# Patient Record
Sex: FEMALE | Race: BLACK OR AFRICAN AMERICAN | NOT HISPANIC OR LATINO | Employment: FULL TIME | URBAN - METROPOLITAN AREA
[De-identification: names, ages, dates, MRNs, and addresses within clinical notes are randomized per-mention and may not be internally consistent; named-entity substitution may affect disease eponyms.]

---

## 2018-02-22 ENCOUNTER — HISTORICAL (OUTPATIENT)
Dept: ADMINISTRATIVE | Facility: HOSPITAL | Age: 23
End: 2018-02-22

## 2018-02-22 LAB — FINAL CULTURE: NORMAL

## 2018-03-06 ENCOUNTER — HISTORICAL (OUTPATIENT)
Dept: LAB | Facility: HOSPITAL | Age: 23
End: 2018-03-06

## 2018-03-06 LAB
ABS NEUT (OLG): 6.47 X10(3)/MCL (ref 2.1–9.2)
AMPHET UR QL SCN: NORMAL
APPEARANCE, UA: CLEAR
BACTERIA SPEC CULT: ABNORMAL /HPF
BARBITURATE SCN PRESENT UR: NORMAL
BASOPHILS # BLD AUTO: 0 X10(3)/MCL (ref 0–0.2)
BASOPHILS NFR BLD AUTO: 0 %
BENZODIAZ UR QL SCN: NORMAL
BILIRUB UR QL STRIP: NEGATIVE
CANNABINOIDS UR QL SCN: NORMAL
COCAINE UR QL SCN: NORMAL
COLOR UR: YELLOW
EOSINOPHIL # BLD AUTO: 0 X10(3)/MCL (ref 0–0.9)
EOSINOPHIL NFR BLD AUTO: 0 %
ERYTHROCYTE [DISTWIDTH] IN BLOOD BY AUTOMATED COUNT: 12.2 % (ref 11.5–17)
GLUCOSE (UA): NEGATIVE
GROUP & RH: NORMAL
HBV SURFACE AG SERPL QL IA: NEGATIVE
HCT VFR BLD AUTO: 37.5 % (ref 37–47)
HGB BLD-MCNC: 11.4 GM/DL (ref 12–16)
HGB UR QL STRIP: NEGATIVE
HIV 1+2 AB+HIV1 P24 AG SERPL QL IA: NEGATIVE
KETONES UR QL STRIP: ABNORMAL
LEUKOCYTE ESTERASE UR QL STRIP: NEGATIVE
LYMPHOCYTES # BLD AUTO: 2.7 X10(3)/MCL (ref 0.6–4.6)
LYMPHOCYTES NFR BLD AUTO: 27 %
MCH RBC QN AUTO: 27.6 PG (ref 27–31)
MCHC RBC AUTO-ENTMCNC: 30.4 GM/DL (ref 33–36)
MCV RBC AUTO: 90.8 FL (ref 80–94)
MONOCYTES # BLD AUTO: 0.7 X10(3)/MCL (ref 0.1–1.3)
MONOCYTES NFR BLD AUTO: 7 %
NEUTROPHILS # BLD AUTO: 6.47 X10(3)/MCL (ref 1.4–7.9)
NEUTROPHILS NFR BLD AUTO: 65 %
NITRITE UR QL STRIP: NEGATIVE
OPIATES UR QL SCN: NORMAL
PCP UR QL: NORMAL
PH UR STRIP.AUTO: 6 [PH] (ref 5–7.5)
PH UR STRIP: 6 [PH] (ref 5–9)
PLATELET # BLD AUTO: 283 X10(3)/MCL (ref 130–400)
PMV BLD AUTO: 10.7 FL (ref 9.4–12.4)
PROT UR QL STRIP: NEGATIVE
RBC # BLD AUTO: 4.13 X10(6)/MCL (ref 4.2–5.4)
RBC #/AREA URNS HPF: ABNORMAL /[HPF]
RPR SER QL: NORMAL
SP GR FLD REFRACTOMETRY: 1.02 (ref 1–1.03)
SP GR UR STRIP: 1.02 (ref 1–1.03)
SQUAMOUS EPITHELIAL, UA: ABNORMAL
UROBILINOGEN UR STRIP-ACNC: 0.2
WBC # SPEC AUTO: 10 X10(3)/MCL (ref 4.5–11.5)
WBC #/AREA URNS HPF: ABNORMAL /[HPF]

## 2018-06-29 ENCOUNTER — HISTORICAL (OUTPATIENT)
Dept: ADMINISTRATIVE | Facility: HOSPITAL | Age: 23
End: 2018-06-29

## 2018-06-29 LAB
ABS NEUT (OLG): 8.33 X10(3)/MCL (ref 2.1–9.2)
BASOPHILS # BLD AUTO: 0 X10(3)/MCL (ref 0–0.2)
BASOPHILS NFR BLD AUTO: 0 %
EOSINOPHIL # BLD AUTO: 0 X10(3)/MCL (ref 0–0.9)
EOSINOPHIL NFR BLD AUTO: 0 %
ERYTHROCYTE [DISTWIDTH] IN BLOOD BY AUTOMATED COUNT: 12.3 % (ref 11.5–17)
GLUCOSE 1H P 100 G GLC PO SERPL-MCNC: 159 MG/DL (ref 100–180)
HCT VFR BLD AUTO: 33.8 % (ref 37–47)
HGB BLD-MCNC: 10.3 GM/DL (ref 12–16)
LYMPHOCYTES # BLD AUTO: 2.4 X10(3)/MCL (ref 0.6–4.6)
LYMPHOCYTES NFR BLD AUTO: 21 %
MCH RBC QN AUTO: 28.8 PG (ref 27–31)
MCHC RBC AUTO-ENTMCNC: 30.5 GM/DL (ref 33–36)
MCV RBC AUTO: 94.4 FL (ref 80–94)
MONOCYTES # BLD AUTO: 0.6 X10(3)/MCL (ref 0.1–1.3)
MONOCYTES NFR BLD AUTO: 6 %
NEUTROPHILS # BLD AUTO: 8.33 X10(3)/MCL (ref 2.1–9.2)
NEUTROPHILS NFR BLD AUTO: 72 %
PLATELET # BLD AUTO: 244 X10(3)/MCL (ref 130–400)
PMV BLD AUTO: 10.4 FL (ref 9.4–12.4)
RBC # BLD AUTO: 3.58 X10(6)/MCL (ref 4.2–5.4)
WBC # SPEC AUTO: 11.6 X10(3)/MCL (ref 4.5–11.5)

## 2018-07-06 ENCOUNTER — HISTORICAL (OUTPATIENT)
Dept: ADMINISTRATIVE | Facility: HOSPITAL | Age: 23
End: 2018-07-06

## 2018-07-06 LAB
GLUCOSE 1H P 100 G GLC PO SERPL-MCNC: 162 MG/DL (ref 100–180)
GLUCOSE 2H P 100 G GLC PO SERPL-MCNC: 147 MG/DL (ref 70–140)
GLUCOSE 3H P 100 G GLC PO SERPL-MCNC: 107 MG/DL (ref 70–115)
GLUCOSE BS SERPL-MCNC: 93 MG/DL (ref 70–115)

## 2018-09-06 ENCOUNTER — HISTORICAL (OUTPATIENT)
Dept: LAB | Facility: HOSPITAL | Age: 23
End: 2018-09-06

## 2018-09-08 LAB — FINAL CULTURE: NORMAL

## 2021-07-26 ENCOUNTER — HISTORICAL (OUTPATIENT)
Dept: ADMINISTRATIVE | Facility: HOSPITAL | Age: 26
End: 2021-07-26

## 2021-07-26 LAB
ABS NEUT (OLG): 5.43 X10(3)/MCL (ref 2.1–9.2)
BASOPHILS # BLD AUTO: 0 X10(3)/MCL (ref 0–0.2)
BASOPHILS NFR BLD AUTO: 0 %
EOSINOPHIL # BLD AUTO: 0.1 X10(3)/MCL (ref 0–0.9)
EOSINOPHIL NFR BLD AUTO: 1 %
ERYTHROCYTE [DISTWIDTH] IN BLOOD BY AUTOMATED COUNT: 12.5 % (ref 11.5–17)
GROUP & RH: NORMAL
HBV SURFACE AG SERPL QL IA: NONREACTIVE
HCT VFR BLD AUTO: 31.5 % (ref 37–47)
HCV AB SERPL QL IA: NONREACTIVE
HGB BLD-MCNC: 10 GM/DL (ref 12–16)
HIV 1+2 AB+HIV1 P24 AG SERPL QL IA: NONREACTIVE
LYMPHOCYTES # BLD AUTO: 1.3 X10(3)/MCL (ref 0.6–4.6)
LYMPHOCYTES NFR BLD AUTO: 17 %
MCH RBC QN AUTO: 28.8 PG (ref 27–31)
MCHC RBC AUTO-ENTMCNC: 31.7 GM/DL (ref 33–36)
MCV RBC AUTO: 90.8 FL (ref 80–94)
MONOCYTES # BLD AUTO: 0.7 X10(3)/MCL (ref 0.1–1.3)
MONOCYTES NFR BLD AUTO: 9 %
NEUTROPHILS # BLD AUTO: 5.43 X10(3)/MCL (ref 2.1–9.2)
NEUTROPHILS NFR BLD AUTO: 72 %
PLATELET # BLD AUTO: 259 X10(3)/MCL (ref 130–400)
PMV BLD AUTO: 11.2 FL (ref 9.4–12.4)
RBC # BLD AUTO: 3.47 X10(6)/MCL (ref 4.2–5.4)
T PALLIDUM AB SER QL: NONREACTIVE
TSH SERPL-ACNC: 0.81 UIU/ML (ref 0.35–4.94)
WBC # SPEC AUTO: 7.5 X10(3)/MCL (ref 4.5–11.5)

## 2021-07-28 LAB — FINAL CULTURE: NORMAL

## 2021-09-13 ENCOUNTER — HISTORICAL (OUTPATIENT)
Dept: ADMINISTRATIVE | Facility: HOSPITAL | Age: 26
End: 2021-09-13

## 2021-09-13 LAB
GLUCOSE 1H P 100 G GLC PO SERPL-MCNC: 126 MG/DL (ref 100–180)
HCT VFR BLD AUTO: 32.9 % (ref 37–47)
HGB BLD-MCNC: 10.1 GM/DL (ref 12–16)

## 2023-02-28 ENCOUNTER — HOSPITAL ENCOUNTER (EMERGENCY)
Facility: HOSPITAL | Age: 28
Discharge: HOME | End: 2023-02-28
Attending: FAMILY MEDICINE
Payer: COMMERCIAL

## 2023-02-28 VITALS
HEART RATE: 67 BPM | WEIGHT: 263 LBS | TEMPERATURE: 98 F | SYSTOLIC BLOOD PRESSURE: 120 MMHG | BODY MASS INDEX: 38.95 KG/M2 | OXYGEN SATURATION: 100 % | HEIGHT: 69 IN | RESPIRATION RATE: 16 BRPM | DIASTOLIC BLOOD PRESSURE: 81 MMHG
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
## 2023-02-28 DIAGNOSIS — S82.831A: Primary | ICD-10-CM

## 2023-02-28 DIAGNOSIS — S82.391A: ICD-10-CM

## 2023-02-28 LAB
B-HCG UR QL: NEGATIVE
CTP QC/QA: YES

## 2023-02-28 PROCEDURE — 81025 URINE PREGNANCY TEST: CPT | Performed by: NURSE PRACTITIONER

## 2023-02-28 PROCEDURE — 29505 APPLICATION LONG LEG SPLINT: CPT | Mod: RT

## 2023-02-28 PROCEDURE — 25000003 PHARM REV CODE 250: Performed by: NURSE PRACTITIONER

## 2023-02-28 PROCEDURE — 99283 EMERGENCY DEPT VISIT LOW MDM: CPT | Mod: 25

## 2023-02-28 RX ORDER — HYDROCODONE BITARTRATE AND ACETAMINOPHEN 5; 325 MG/1; MG/1
1 TABLET ORAL EVERY 6 HOURS PRN
Qty: 18 TABLET | Refills: 0 | Status: SHIPPED | OUTPATIENT
Start: 2023-02-28 | End: 2023-03-09 | Stop reason: ALTCHOICE

## 2023-02-28 RX ORDER — IBUPROFEN 400 MG/1
800 TABLET ORAL
Status: COMPLETED | OUTPATIENT
Start: 2023-02-28 | End: 2023-02-28

## 2023-02-28 RX ADMIN — BACITRACIN, NEOMYCIN, POLYMYXIN B: 400; 3.5; 5 OINTMENT TOPICAL at 09:02

## 2023-02-28 RX ADMIN — IBUPROFEN 800 MG: 400 TABLET ORAL at 08:02

## 2023-02-28 NOTE — ED PROVIDER NOTES
Encounter Date: 2/28/2023       History     Chief Complaint   Patient presents with    Ankle Pain     HOSP. EMPLOYEE REPORTS TWISTING RT ANKLE/KNEE IN HOSP. PARKING LOT PTA.  MINOR ABRASION TO ANKLE AND KNEE.      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 The onset was just pta. The course of symptoms is constant. Type of injury: trip and fall. The location where the incident occurred was in the hospital parking lot.  Location: right ankle and knee. The character of symptoms is pain. The degree of bleeding is none. The degree of pain is minimal. The exacerbating factor is movement and weight bearing. The relieving factor is immobilization. Risk factors consist of none. Therapy today: none.  Associated symptoms: none.       Review of patient's allergies indicates:  No Known Allergies  History reviewed. No pertinent past medical history.  History reviewed. No pertinent surgical history.  History reviewed. No pertinent family history.  Social History     Tobacco Use    Smoking status: Never    Smokeless tobacco: Never     Review of Systems   Constitutional:  Negative for fever.   HENT:  Negative for sore throat.    Respiratory:  Negative for shortness of breath.    Cardiovascular:  Negative for chest pain.   Gastrointestinal:  Negative for nausea.   Genitourinary:  Negative for dysuria.   Musculoskeletal:  Positive for arthralgias. Negative for back pain.   Skin:  Positive for wound. Negative for rash.   Neurological:  Negative for weakness.   Hematological:  Does not bruise/bleed easily.   All other systems reviewed and are negative.    Physical Exam     Initial Vitals [02/28/23 0740]   BP Pulse Resp Temp SpO2   120/81 67 16 97.9 °F (36.6 °C) 100 %      MAP       --         Physical Exam    Nursing note and vitals reviewed.  Constitutional: She appears  well-developed and well-nourished.   HENT:   Head: Normocephalic and atraumatic.   Neck: Neck supple.   Normal range of motion.  Cardiovascular:  Normal rate, regular rhythm, normal heart sounds and intact distal pulses.           Pulmonary/Chest: Breath sounds normal.   Abdominal: Abdomen is soft. Bowel sounds are normal.   Musculoskeletal:         General: Normal range of motion.      Cervical back: Normal range of motion and neck supple.      Comments: Mild ttp right knee without swelling, FROM, mild ttp and swelling right lateral ankle with decreased rom d/t pain, small clean abrasion to medial right ankle, good distal pulses, NVI, right foot warm with good pulses     Neurological: She is alert. She has normal strength.   Skin: Skin is warm and dry.   Psychiatric: She has a normal mood and affect.       ED Course   Splint Application    Date/Time: 2/28/2023 10:30 AM  Performed by: CHATA Bellamy  Authorized by: CHATA Bellamy   Location details: right leg  Splint type: long posterior splint.  Supplies used: Ortho-Glass, elastic bandage and cotton padding  Post-procedure: The splinted body part was neurovascularly unchanged following the procedure.  Patient tolerance: Patient tolerated the procedure well with no immediate complications  Comments: Crutches given      Labs Reviewed   POCT URINE PREGNANCY          Imaging Results              X-Ray Knee 1 or 2 View Right (Final result)  Result time 02/28/23 09:07:12      Final result by Johnny Stoll MD (02/28/23 09:07:12)                   Impression:      Nondisplaced fracture proximal fibula.      Electronically signed by: Johnny Stoll  Date:    02/28/2023  Time:    09:07               Narrative:    EXAMINATION:  XR KNEE 1 OR 2 VIEW RIGHT    CLINICAL HISTORY:  Unspecified fall, initial encounter    COMPARISON:  None    FINDINGS:  Two views of the right knee.  There is nondisplaced fracture proximal shaft of the fibula.  No joint effusion.                                        X-Ray Ankle Complete Right (Final result)  Result time 02/28/23 09:11:54      Final result by Johnny Stoll MD (02/28/23 09:11:54)                   Impression:      Nondisplaced posterior malleolar fracture.      Electronically signed by: Johnny Stoll  Date:    02/28/2023  Time:    09:11               Narrative:    EXAMINATION:  XR ANKLE COMPLETE 3 VIEW RIGHT    CLINICAL HISTORY:  Unspecified fall, initial encounter    COMPARISON:  None    FINDINGS:  Three views of the right ankle.  There is nondisplaced fracture through the posterior malleolus.                                       X-Ray Foot Complete Right (Final result)  Result time 02/28/23 09:12:28      Final result by Johnny Stoll MD (02/28/23 09:12:28)                   Impression:      No acute findings right foot.      Electronically signed by: Johnny Stoll  Date:    02/28/2023  Time:    09:12               Narrative:    EXAMINATION:  XR FOOT COMPLETE 3 VIEW RIGHT    CLINICAL HISTORY:  Unspecified fall, initial encounter    COMPARISON:  None    FINDINGS:  Three views of the right foot.  There is nondisplaced posterior malleolar fracture better demonstrated on ankle radiographs.  No fracture or dislocation of the foot.                                       Medications   Tdap (BOOSTRIX) vaccine injection 0.5 mL (has no administration in time range)   neomycin-bacitracnZn-polymyxnB packet (has no administration in time range)   ibuprofen tablet 800 mg (800 mg Oral Given 2/28/23 0808)     Medical Decision Making:   History:   Old Records Summarized: records from clinic visits and records from previous admission(s).  Clinical Tests:   Radiological Study: Ordered and Reviewed  10:37 AM DISPOSITION: The patient is resting comfortably in no acute distress.  She is hemodynamically stable and is without objective evidence for acute process requiring urgent intervention or hospitalization. I provided counseling to patient with regard to  condition, the treatment plan, specific conditions for return, and the importance of follow up. Detailed written and verbal instructions provided to patient and she expressed a verbal understanding of the discharge instructions and overall management plan. Reiterated the importance of medication administration and safety and advised patient to follow up with primary care provider in 3-5 days or sooner if needed.  Answered questions at this time. The patient is stable for discharge.                           Clinical Impression:   Final diagnoses:  [W19.XXXA] Fall  [S82.831A] Closed traumatic nondisplaced fracture of proximal end of right fibula, initial encounter (Primary)  [S82.391A] Closed traumatic nondisplaced fracture of posterior malleolus of right tibia, initial encounter        ED Disposition Condition    Discharge Stable          ED Prescriptions       Medication Sig Dispense Start Date End Date Auth. Provider    HYDROcodone-acetaminophen (NORCO) 5-325 mg per tablet Take 1 tablet by mouth every 6 (six) hours as needed for Pain. 18 tablet 2/28/2023 -- CHATA Bellamy          Follow-up Information       Follow up With Specialties Details Why Contact Info    urgent referral sent to orthopedic clinic        [facility] Emergency Medicine  If symptoms worsen [address] [facility] [id number] [phone]             CHATA Bellamy  02/28/23 1037

## 2023-02-28 NOTE — Clinical Note
"Elicia Leal"Sadiq was seen and treated in our emergency department on 2/28/2023.  She may return to work on 03/06/2023.  no weight bearing on right leg until cleared by orthopedist     If you have any questions or concerns, please don't hesitate to call.      CHATA Bellamy"

## 2023-03-01 ENCOUNTER — OFFICE VISIT (OUTPATIENT)
Dept: ORTHOPEDICS | Facility: CLINIC | Age: 28
End: 2023-03-01
Payer: COMMERCIAL

## 2023-03-01 ENCOUNTER — HOSPITAL ENCOUNTER (OUTPATIENT)
Dept: RADIOLOGY | Facility: HOSPITAL | Age: 28
Discharge: HOME | End: 2023-03-01
Attending: ORTHOPAEDIC SURGERY
Payer: COMMERCIAL

## 2023-03-01 ENCOUNTER — HOSPITAL ENCOUNTER (OUTPATIENT)
Dept: RADIOLOGY | Facility: HOSPITAL | Age: 28
Discharge: HOME | End: 2023-03-01
Attending: STUDENT IN AN ORGANIZED HEALTH CARE EDUCATION/TRAINING PROGRAM
Payer: COMMERCIAL

## 2023-03-01 DIAGNOSIS — S82.831A: Primary | ICD-10-CM

## 2023-03-01 DIAGNOSIS — S82.831A: ICD-10-CM

## 2023-03-01 DIAGNOSIS — S82.863A MAISONNEUVE FRACTURE: ICD-10-CM

## 2023-03-01 DIAGNOSIS — S82.391A: ICD-10-CM

## 2023-03-01 PROCEDURE — 99499 NO LOS: ICD-10-PCS | Mod: S$PBB,,, | Performed by: SPECIALIST

## 2023-03-01 PROCEDURE — 73610 X-RAY EXAM OF ANKLE: CPT | Mod: TC,RT

## 2023-03-01 PROCEDURE — 73590 X-RAY EXAM OF LOWER LEG: CPT | Mod: TC,RT

## 2023-03-01 PROCEDURE — 1159F PR MEDICATION LIST DOCUMENTED IN MEDICAL RECORD: ICD-10-PCS | Mod: CPTII,,, | Performed by: SPECIALIST

## 2023-03-01 PROCEDURE — 99214 OFFICE O/P EST MOD 30 MIN: CPT | Mod: PBBFAC

## 2023-03-01 PROCEDURE — 99499 UNLISTED E&M SERVICE: CPT | Mod: S$PBB,,, | Performed by: SPECIALIST

## 2023-03-01 PROCEDURE — 1159F MED LIST DOCD IN RCRD: CPT | Mod: CPTII,,, | Performed by: SPECIALIST

## 2023-03-01 RX ORDER — NORGESTIMATE AND ETHINYL ESTRADIOL 0.25-0.035
1 KIT ORAL
COMMUNITY
Start: 2023-01-24 | End: 2023-10-17

## 2023-03-01 RX ORDER — MUPIROCIN 20 MG/G
OINTMENT TOPICAL
Status: CANCELLED | OUTPATIENT
Start: 2023-03-01

## 2023-03-01 NOTE — PROGRESS NOTES
[facility] Orthopaedic Surgery H&P Note    In brief, Elicia Babcock is a 27 y.o. female acute right ankle pain    HPI:  This is a pleasant 27-year-old female here for initial evaluation of acute right ankle pain after she slipped and fell in the parking lot and had a twisting ankle injury.  She says she is been unable to walk on her ankle since then, she is been in a splint for the past day.  She did not injure anything else when she fell.  She works here at the hospital.  She says she is otherwise healthy, no history of smoking or diabetes or cardiac history.  BMI 38.     PMH: History reviewed. No pertinent past medical history.    PSH: History reviewed. No pertinent surgical history.    SH:   Social History     Socioeconomic History    Marital status: Single   Tobacco Use    Smoking status: Never    Smokeless tobacco: Never       FH:   Family History   Family history unknown: Yes       Allergies: Review of patient's allergies indicates:  No Known Allergies    ROS:  Constitutional- no fever, fatigue, weakness  Eye- no vision loss, eye redness, drainage, or pain  ENMT- no sore throat, ear pain, sinus pain/congestion, nasal congestion/drainage  Respiratory- no cough, wheezing, or shortness of breath  CV- no chest pain, no palpitations, no edema  GI- no N/V/D; no abdominal pain    Physical Exam:  There were no vitals filed for this visit.    General: NAD  Cardio: RRR by peripheral pulse  Pulm: Normal WOB on room air, symmetric chest rise  Abd: Soft, NT/ND    MSK:  Right lower extremity  Negative logroll   Full painless flexion extension of the knee  Tenderness palpation of the proximal fibula  Tenderness to palpation over the medial malleolus   Notable ankle swelling   A superficial abrasion over the anteromedial aspect of the ankle   No open wounds our deep lacerations   Pain with ankle dorsiflexion plantar flexion  Some diminished sensation of the dorsum of the foot   Sensation intact to light touch over the plantar aspect  of the foot  Foot is warm and well-perfused   EHL/FHL 5/5       Imaging:   Independent review of radiographs shows a Maisonneuve ankle fracture, medial and posterior malleolus fracture with proximal fibula fracture.  Ankle mortise is well aligned    Assessment:  27-year-old female Maisonneuve ankle fracture sustained on 02/28/2023     -we had a long discussion that the injury pattern warrants fixation of her right ankle.  We will get a CT scan to further evaluate the extent of the osseous injury   -we will put her on the schedule tentatively for right ankle syndesmosis fixation with ankle ORIF on 03/14/2023   -we will put her in a splint for now, maintain strict nonweightbearing, went over the importance of elevation to help with preoperative swelling  -we will see her back next Wednesday to discuss the results and book her for surgery        Dr. Holly Puga  [facility] Orthopaedic Surgery           None

## 2023-03-01 NOTE — PROGRESS NOTES
Faculty Attestation: Elicia Babcock  was seen at [facility] in the Orthopaedic Clinic. Discussed with the resident at the time of the visit. History of Present Illness, Physical Exam, and Assessment and Plan reviewed. Treatment plan is reasonable and appropriate. Compliance with treatment recommendations is important. Discussed with the resident at the time of the visit.  No procedure was performed.     Guevara Mae MD MD  Orthopaedic Surgery

## 2023-03-06 ENCOUNTER — HOSPITAL ENCOUNTER (OUTPATIENT)
Dept: RADIOLOGY | Facility: HOSPITAL | Age: 28
Discharge: HOME | End: 2023-03-06
Attending: ORTHOPAEDIC SURGERY
Payer: COMMERCIAL

## 2023-03-06 DIAGNOSIS — S82.831A: ICD-10-CM

## 2023-03-06 PROCEDURE — 73700 CT LOWER EXTREMITY W/O DYE: CPT | Mod: TC,RT

## 2023-03-07 ENCOUNTER — ANESTHESIA EVENT (OUTPATIENT)
Dept: SURGERY | Facility: HOSPITAL | Age: 28
End: 2023-03-07
Payer: COMMERCIAL

## 2023-03-08 ENCOUNTER — HOSPITAL ENCOUNTER (OUTPATIENT)
Dept: RADIOLOGY | Facility: HOSPITAL | Age: 28
Discharge: HOME | End: 2023-03-08
Attending: STUDENT IN AN ORGANIZED HEALTH CARE EDUCATION/TRAINING PROGRAM
Payer: COMMERCIAL

## 2023-03-08 ENCOUNTER — OFFICE VISIT (OUTPATIENT)
Dept: ORTHOPEDICS | Facility: CLINIC | Age: 28
End: 2023-03-08
Payer: OTHER MISCELLANEOUS

## 2023-03-08 VITALS — HEIGHT: 69 IN | BODY MASS INDEX: 38.95 KG/M2 | WEIGHT: 263 LBS

## 2023-03-08 DIAGNOSIS — M25.571 CHRONIC PAIN OF RIGHT ANKLE: Primary | ICD-10-CM

## 2023-03-08 DIAGNOSIS — G89.29 CHRONIC PAIN OF RIGHT ANKLE: ICD-10-CM

## 2023-03-08 DIAGNOSIS — M25.571 CHRONIC PAIN OF RIGHT ANKLE: ICD-10-CM

## 2023-03-08 DIAGNOSIS — G89.29 CHRONIC PAIN OF RIGHT ANKLE: Primary | ICD-10-CM

## 2023-03-08 PROCEDURE — 99204 OFFICE O/P NEW MOD 45 MIN: CPT | Mod: S$PBB,,, | Performed by: STUDENT IN AN ORGANIZED HEALTH CARE EDUCATION/TRAINING PROGRAM

## 2023-03-08 PROCEDURE — 99213 OFFICE O/P EST LOW 20 MIN: CPT | Mod: PBBFAC

## 2023-03-08 PROCEDURE — 99204 PR OFFICE/OUTPT VISIT, NEW, LEVL IV, 45-59 MIN: ICD-10-PCS | Mod: S$PBB,,, | Performed by: STUDENT IN AN ORGANIZED HEALTH CARE EDUCATION/TRAINING PROGRAM

## 2023-03-08 NOTE — PROGRESS NOTES
[facility] Orthopaedic Surgery H&P Note    In brief, Elicia Babcock is a 27 y.o. female acute right ankle pain    HPI:  This is a pleasant 27-year-old female here for initial evaluation of acute right ankle pain after she slipped and fell in the parking lot and had a twisting ankle injury.  She says she is been unable to walk on her ankle since then, she is been in a splint for the past day.  She did not injure anything else when she fell.  She works here at the hospital.  She says she is otherwise healthy, no history of smoking or diabetes or cardiac history.  BMI 38.     She is here to discuss her CT scan results    PMH: No past medical history on file.    PSH: No past surgical history on file.    SH:   Social History     Socioeconomic History    Marital status: Single   Tobacco Use    Smoking status: Never    Smokeless tobacco: Never       FH:   Family History   Family history unknown: Yes       Allergies: Review of patient's allergies indicates:  No Known Allergies    ROS:  Constitutional- no fever, fatigue, weakness  Eye- no vision loss, eye redness, drainage, or pain  ENMT- no sore throat, ear pain, sinus pain/congestion, nasal congestion/drainage  Respiratory- no cough, wheezing, or shortness of breath  CV- no chest pain, no palpitations, no edema  GI- no N/V/D; no abdominal pain    Physical Exam:  There were no vitals filed for this visit.    General: NAD  Cardio: RRR by peripheral pulse  Pulm: Normal WOB on room air, symmetric chest rise  Abd: Soft, NT/ND    MSK:  Right lower extremity  Negative logroll   Full painless flexion extension of the knee  Tenderness palpation of the proximal fibula  Tenderness to palpation over the medial malleolus   Notable ankle swelling   A superficial abrasion over the anteromedial aspect of the ankle   No open wounds our deep lacerations   Pain with ankle dorsiflexion plantar flexion  Some diminished sensation of the dorsum of the foot   Sensation intact to light touch over the  plantar aspect of the foot  Foot is warm and well-perfused   EHL/FHL 5/5       Imaging:   Independent review of radiographs shows a Maisonneuve ankle fracture, medial and posterior malleolus fracture with proximal fibula fracture.  Ankle mortise is well aligned.  Independent review of CT scan shows a nondisplaced medial malleolus vertical fracture, and a minimally displaced intra-articular posterior malleolus fracture    Assessment:  27-year-old female Maisonneuve ankle fracture sustained on 02/28/2023     -we had a long discussion that the injury pattern warrants fixation of her right ankle.  We will get a CT scan to further evaluate the extent of the osseous injury   -we will put her on the schedule tentatively for right ankle syndesmosis fixation with ankle ORIF on 03/14/2023   -we will put her in a splint for now, maintain strict nonweightbearing, went over the importance of elevation to help with preoperative swelling  -we will see her back next Wednesday to discuss the results and book her for surgery.  We discussed the indication for right ankle syndesmosis fixation, with medial and posterior malleolus fixation.  We discussed the risk of infection, nonunion, malunion, need for further surgical intervention, posttraumatic arthritis, ankle instability, chronic pain, loss of function, neurovascular injury, loss of limb and life        Dr. Holly Puga  [facility] Orthopaedic Surgery

## 2023-03-09 NOTE — PROGRESS NOTES
PAT call completed with patient.     Marijuana in the past no longer uses.    ETOH very rarely    Smoke never     Out of Norco     No meds am DOS

## 2023-03-13 NOTE — PROGRESS NOTES
Faculty Attestation: Elicia Babcock  was seen at [facility] in the Orthopaedic Clinic. Patient seen and evaluated at the time of the visit. History of Present Illness, Physical Exam, and Assessment and Plan reviewed. Treatment plan is reasonable and appropriate. Compliance with treatment recommendations is important. No procedure was performed.     Zach Barker MD  Orthopaedic Surgery

## 2023-03-13 NOTE — H&P
Faculty Attestation  Preop Dx: right ankle distal tibia fx and syndesmosis disruption  Plan: ORIF right ankle fx and syndesmosis fixation  Lead wrap to protect fetus due to pregnancy  Patient seen and examined preoperatively by myself  I agree with the resident's History & Physical.  Proceed with case.    Nik Dobson  Orthopaedic Surgery Orthopedic Surgery Interval H&P    Patients history and exam have been reviewed. There are no changes from previous H&P documented below. Plan for OR today with Dr. Dobson for ORIF right ankle, possible syndesmosis.  Discharge home following surgery    Ben Gary MD  [facility] Orthopedic Surgery        [facility] Orthopaedic Surgery H&P Note    In brief, Elicia Babcock is a 27 y.o. female acute right ankle pain    HPI:  This is a pleasant 27-year-old female here for initial evaluation of acute right ankle pain after she slipped and fell in the parking lot and had a twisting ankle injury.  She says she is been unable to walk on her ankle since then, she is been in a splint for the past day.  She did not injure anything else when she fell.  She works here at the hospital.  She says she is otherwise healthy, no history of smoking or diabetes or cardiac history.  BMI 38.     PMH: History reviewed. No pertinent past medical history.    PSH: History reviewed. No pertinent surgical history.    SH:   Social History     Socioeconomic History    Marital status: Single   Tobacco Use    Smoking status: Never    Smokeless tobacco: Never   Substance and Sexual Activity    Drug use: Never       FH:   Family History   Family history unknown: Yes       Allergies: Review of patient's allergies indicates:  No Known Allergies    ROS:  Constitutional- no fever, fatigue, weakness  Eye- no vision loss, eye redness, drainage, or pain  ENMT- no sore throat, ear pain, sinus pain/congestion, nasal congestion/drainage  Respiratory- no cough, wheezing, or shortness of breath  CV- no chest pain, no  palpitations, no edema  GI- no N/V/D; no abdominal pain    Physical Exam:  There were no vitals filed for this visit.    General: NAD  Cardio: RRR by peripheral pulse  Pulm: Normal WOB on room air, symmetric chest rise  Abd: Soft, NT/ND    MSK:  Right lower extremity  Negative logroll   Full painless flexion extension of the knee  Tenderness palpation of the proximal fibula  Tenderness to palpation over the medial malleolus   Notable ankle swelling   A superficial abrasion over the anteromedial aspect of the ankle   No open wounds our deep lacerations   Pain with ankle dorsiflexion plantar flexion  Some diminished sensation of the dorsum of the foot   Sensation intact to light touch over the plantar aspect of the foot  Foot is warm and well-perfused   EHL/FHL 5/5       Imaging:   Independent review of radiographs shows a Maisonneuve ankle fracture, medial and posterior malleolus fracture with proximal fibula fracture.  Ankle mortise is well aligned    Assessment:  27-year-old female Maisonneuve ankle fracture sustained on 02/28/2023     -we had a long discussion that the injury pattern warrants fixation of her right ankle.  We will get a CT scan to further evaluate the extent of the osseous injury   -we will put her on the schedule tentatively for right ankle syndesmosis fixation with ankle ORIF on 03/14/2023   -we will put her in a splint for now, maintain strict nonweightbearing, went over the importance of elevation to help with preoperative swelling  -we will see her back next Wednesday to discuss the results and book her for surgery        Dr. Holly Puga  [facility] Orthopaedic Surgery

## 2023-03-14 ENCOUNTER — ANESTHESIA (OUTPATIENT)
Dept: SURGERY | Facility: HOSPITAL | Age: 28
End: 2023-03-14
Payer: COMMERCIAL

## 2023-03-14 ENCOUNTER — HOSPITAL ENCOUNTER (OUTPATIENT)
Facility: HOSPITAL | Age: 28
Discharge: HOME | End: 2023-03-14
Attending: ORTHOPAEDIC SURGERY | Admitting: ORTHOPAEDIC SURGERY
Payer: COMMERCIAL

## 2023-03-14 VITALS
TEMPERATURE: 98 F | OXYGEN SATURATION: 99 % | SYSTOLIC BLOOD PRESSURE: 138 MMHG | HEART RATE: 78 BPM | RESPIRATION RATE: 19 BRPM | DIASTOLIC BLOOD PRESSURE: 81 MMHG

## 2023-03-14 DIAGNOSIS — S82.831A: ICD-10-CM

## 2023-03-14 DIAGNOSIS — S82.863A MAISONNEUVE FRACTURE: ICD-10-CM

## 2023-03-14 DIAGNOSIS — S82.391A: ICD-10-CM

## 2023-03-14 LAB
B-HCG FREE SERPL-ACNC: 496.05 MIU/ML
B-HCG UR QL: POSITIVE
CTP QC/QA: YES

## 2023-03-14 PROCEDURE — 27829 TREAT LOWER LEG JOINT: CPT | Mod: RT,,, | Performed by: SPECIALIST

## 2023-03-14 PROCEDURE — 63600175 PHARM REV CODE 636 W HCPCS: Performed by: ORTHOPAEDIC SURGERY

## 2023-03-14 PROCEDURE — 36000708 HC OR TIME LEV III 1ST 15 MIN: Performed by: SPECIALIST

## 2023-03-14 PROCEDURE — 64447 NJX AA&/STRD FEMORAL NRV IMG: CPT | Performed by: ANESTHESIOLOGY

## 2023-03-14 PROCEDURE — 64445 NJX AA&/STRD SCIATIC NRV IMG: CPT | Performed by: ANESTHESIOLOGY

## 2023-03-14 PROCEDURE — 37000008 HC ANESTHESIA 1ST 15 MINUTES: Performed by: SPECIALIST

## 2023-03-14 PROCEDURE — 63600175 PHARM REV CODE 636 W HCPCS

## 2023-03-14 PROCEDURE — 25000003 PHARM REV CODE 250: Performed by: ANESTHESIOLOGY

## 2023-03-14 PROCEDURE — 81025 URINE PREGNANCY TEST: CPT | Performed by: NURSE PRACTITIONER

## 2023-03-14 PROCEDURE — 63600175 PHARM REV CODE 636 W HCPCS: Performed by: ANESTHESIOLOGY

## 2023-03-14 PROCEDURE — 27766 OPTX MEDIAL ANKLE FX: CPT | Mod: 51,RT,, | Performed by: SPECIALIST

## 2023-03-14 PROCEDURE — 27201423 OPTIME MED/SURG SUP & DEVICES STERILE SUPPLY: Performed by: SPECIALIST

## 2023-03-14 PROCEDURE — 36000709 HC OR TIME LEV III EA ADD 15 MIN: Performed by: SPECIALIST

## 2023-03-14 PROCEDURE — 84702 CHORIONIC GONADOTROPIN TEST: CPT | Performed by: ORTHOPAEDIC SURGERY

## 2023-03-14 PROCEDURE — 27766 PR OPEN TREATMENT MEDIAL MALLEOLUS FRACTURE: ICD-10-PCS | Mod: 51,RT,, | Performed by: SPECIALIST

## 2023-03-14 PROCEDURE — 27829 PR OPEN TX DISTAL TIBIOFIBULAR JOINT DISRUPTION: ICD-10-PCS | Mod: RT,,, | Performed by: SPECIALIST

## 2023-03-14 PROCEDURE — 25000003 PHARM REV CODE 250: Performed by: NURSE ANESTHETIST, CERTIFIED REGISTERED

## 2023-03-14 PROCEDURE — 71000033 HC RECOVERY, INTIAL HOUR: Performed by: SPECIALIST

## 2023-03-14 PROCEDURE — 63600175 PHARM REV CODE 636 W HCPCS: Performed by: NURSE ANESTHETIST, CERTIFIED REGISTERED

## 2023-03-14 PROCEDURE — 37000009 HC ANESTHESIA EA ADD 15 MINS: Performed by: SPECIALIST

## 2023-03-14 PROCEDURE — 71000015 HC POSTOP RECOV 1ST HR: Performed by: SPECIALIST

## 2023-03-14 PROCEDURE — 71000016 HC POSTOP RECOV ADDL HR: Performed by: SPECIALIST

## 2023-03-14 PROCEDURE — C1713 ANCHOR/SCREW BN/BN,TIS/BN: HCPCS | Performed by: SPECIALIST

## 2023-03-14 DEVICE — K-LESS T-ROPE W/DRV, SYN REPR, SS
Type: IMPLANTABLE DEVICE | Site: ANKLE | Status: FUNCTIONAL
Brand: ARTHREX®

## 2023-03-14 DEVICE — IMPLANTABLE DEVICE: Type: IMPLANTABLE DEVICE | Site: ANKLE | Status: FUNCTIONAL

## 2023-03-14 RX ORDER — SODIUM CHLORIDE 0.9 % (FLUSH) 0.9 %
SYRINGE (ML) INJECTION
Status: DISCONTINUED
Start: 2023-03-14 | End: 2023-03-14 | Stop reason: HOSPADM

## 2023-03-14 RX ORDER — ONDANSETRON 2 MG/ML
INJECTION INTRAMUSCULAR; INTRAVENOUS
Status: DISCONTINUED | OUTPATIENT
Start: 2023-03-14 | End: 2023-03-14

## 2023-03-14 RX ORDER — HYDROMORPHONE HYDROCHLORIDE 1 MG/ML
0.4 INJECTION, SOLUTION INTRAMUSCULAR; INTRAVENOUS; SUBCUTANEOUS EVERY 10 MIN PRN
Status: DISCONTINUED | OUTPATIENT
Start: 2023-03-14 | End: 2023-03-14 | Stop reason: HOSPADM

## 2023-03-14 RX ORDER — CEFAZOLIN SODIUM 1 G/3ML
2 INJECTION, POWDER, FOR SOLUTION INTRAMUSCULAR; INTRAVENOUS
Status: COMPLETED | OUTPATIENT
Start: 2023-03-14 | End: 2023-03-14

## 2023-03-14 RX ORDER — SODIUM CITRATE AND CITRIC ACID MONOHYDRATE 334; 500 MG/5ML; MG/5ML
30 SOLUTION ORAL ONCE
Status: COMPLETED | OUTPATIENT
Start: 2023-03-14 | End: 2023-03-14

## 2023-03-14 RX ORDER — PROPOFOL 10 MG/ML
INJECTION, EMULSION INTRAVENOUS
Status: DISCONTINUED | OUTPATIENT
Start: 2023-03-14 | End: 2023-03-14

## 2023-03-14 RX ORDER — FENTANYL CITRATE 50 UG/ML
INJECTION, SOLUTION INTRAMUSCULAR; INTRAVENOUS
Status: DISCONTINUED | OUTPATIENT
Start: 2023-03-14 | End: 2023-03-14

## 2023-03-14 RX ORDER — SODIUM CHLORIDE, SODIUM LACTATE, POTASSIUM CHLORIDE, CALCIUM CHLORIDE 600; 310; 30; 20 MG/100ML; MG/100ML; MG/100ML; MG/100ML
INJECTION, SOLUTION INTRAVENOUS CONTINUOUS
Status: ACTIVE | OUTPATIENT
Start: 2023-03-14 | End: 2023-03-14

## 2023-03-14 RX ORDER — SODIUM CHLORIDE, SODIUM LACTATE, POTASSIUM CHLORIDE, CALCIUM CHLORIDE 600; 310; 30; 20 MG/100ML; MG/100ML; MG/100ML; MG/100ML
INJECTION, SOLUTION INTRAVENOUS CONTINUOUS
Status: ACTIVE | OUTPATIENT
Start: 2023-03-14

## 2023-03-14 RX ORDER — MUPIROCIN 20 MG/G
OINTMENT TOPICAL
Status: DISCONTINUED | OUTPATIENT
Start: 2023-03-14 | End: 2023-03-14 | Stop reason: HOSPADM

## 2023-03-14 RX ORDER — ROPIVACAINE HYDROCHLORIDE 5 MG/ML
INJECTION, SOLUTION EPIDURAL; INFILTRATION; PERINEURAL
Status: COMPLETED
Start: 2023-03-14 | End: 2023-03-14

## 2023-03-14 RX ORDER — GABAPENTIN 300 MG/1
300 CAPSULE ORAL 3 TIMES DAILY
Qty: 90 CAPSULE | Refills: 11 | Status: SHIPPED | OUTPATIENT
Start: 2023-03-14 | End: 2023-03-14 | Stop reason: HOSPADM

## 2023-03-14 RX ORDER — DEXAMETHASONE SODIUM PHOSPHATE 4 MG/ML
INJECTION, SOLUTION INTRA-ARTICULAR; INTRALESIONAL; INTRAMUSCULAR; INTRAVENOUS; SOFT TISSUE
Status: DISCONTINUED | OUTPATIENT
Start: 2023-03-14 | End: 2023-03-14

## 2023-03-14 RX ORDER — METHOCARBAMOL 500 MG/1
500 TABLET, FILM COATED ORAL 4 TIMES DAILY
Qty: 40 TABLET | Refills: 0 | Status: SHIPPED | OUTPATIENT
Start: 2023-03-14 | End: 2023-03-14 | Stop reason: HOSPADM

## 2023-03-14 RX ORDER — ACETAMINOPHEN 10 MG/ML
650 INJECTION, SOLUTION INTRAVENOUS
Status: COMPLETED | OUTPATIENT
Start: 2023-03-14 | End: 2023-03-14

## 2023-03-14 RX ORDER — ONDANSETRON 4 MG/1
4 TABLET, ORALLY DISINTEGRATING ORAL 2 TIMES DAILY
Qty: 10 TABLET | Refills: 0 | Status: SHIPPED | OUTPATIENT
Start: 2023-03-14 | End: 2023-03-14 | Stop reason: HOSPADM

## 2023-03-14 RX ORDER — FAMOTIDINE 20 MG/50ML
20 INJECTION, SOLUTION INTRAVENOUS ONCE
Status: DISCONTINUED | OUTPATIENT
Start: 2023-03-14 | End: 2023-03-14 | Stop reason: HOSPADM

## 2023-03-14 RX ORDER — LIDOCAINE HCL/PF 100 MG/5ML
SYRINGE (ML) INTRAVENOUS
Status: DISCONTINUED | OUTPATIENT
Start: 2023-03-14 | End: 2023-03-14

## 2023-03-14 RX ORDER — ROPIVACAINE HYDROCHLORIDE 5 MG/ML
INJECTION, SOLUTION EPIDURAL; INFILTRATION; PERINEURAL
Status: COMPLETED | OUTPATIENT
Start: 2023-03-14 | End: 2023-03-14

## 2023-03-14 RX ORDER — MIDAZOLAM HYDROCHLORIDE 1 MG/ML
5 INJECTION INTRAMUSCULAR; INTRAVENOUS ONCE AS NEEDED
Status: ACTIVE | OUTPATIENT
Start: 2023-03-14 | End: 2034-08-09

## 2023-03-14 RX ORDER — LIDOCAINE HYDROCHLORIDE 10 MG/ML
1 INJECTION, SOLUTION EPIDURAL; INFILTRATION; INTRACAUDAL; PERINEURAL ONCE
Status: ACTIVE | OUTPATIENT
Start: 2023-03-14

## 2023-03-14 RX ORDER — OXYCODONE AND ACETAMINOPHEN 5; 325 MG/1; MG/1
1 TABLET ORAL EVERY 4 HOURS PRN
Qty: 28 TABLET | Refills: 0 | Status: SHIPPED | OUTPATIENT
Start: 2023-03-14 | End: 2023-03-15

## 2023-03-14 RX ORDER — ACETAMINOPHEN 10 MG/ML
INJECTION, SOLUTION INTRAVENOUS
Status: COMPLETED
Start: 2023-03-14 | End: 2023-03-14

## 2023-03-14 RX ORDER — ONDANSETRON 2 MG/ML
4 INJECTION INTRAMUSCULAR; INTRAVENOUS ONCE AS NEEDED
Status: DISCONTINUED | OUTPATIENT
Start: 2023-03-14 | End: 2023-03-14 | Stop reason: HOSPADM

## 2023-03-14 RX ORDER — HYDROMORPHONE HYDROCHLORIDE 1 MG/ML
INJECTION, SOLUTION INTRAMUSCULAR; INTRAVENOUS; SUBCUTANEOUS
Status: COMPLETED
Start: 2023-03-14 | End: 2023-03-14

## 2023-03-14 RX ORDER — HYDROMORPHONE HYDROCHLORIDE 1 MG/ML
INJECTION, SOLUTION INTRAMUSCULAR; INTRAVENOUS; SUBCUTANEOUS
Status: DISCONTINUED
Start: 2023-03-14 | End: 2023-03-14 | Stop reason: HOSPADM

## 2023-03-14 RX ORDER — SODIUM CITRATE AND CITRIC ACID MONOHYDRATE 334; 500 MG/5ML; MG/5ML
SOLUTION ORAL
Status: DISCONTINUED
Start: 2023-03-14 | End: 2023-03-14 | Stop reason: HOSPADM

## 2023-03-14 RX ORDER — IBUPROFEN 800 MG/1
800 TABLET ORAL 3 TIMES DAILY
Qty: 60 TABLET | Refills: 0 | Status: SHIPPED | OUTPATIENT
Start: 2023-03-14 | End: 2023-03-14 | Stop reason: HOSPADM

## 2023-03-14 RX ADMIN — SODIUM CHLORIDE, POTASSIUM CHLORIDE, SODIUM LACTATE AND CALCIUM CHLORIDE: 600; 310; 30; 20 INJECTION, SOLUTION INTRAVENOUS at 08:03

## 2023-03-14 RX ADMIN — DEXAMETHASONE SODIUM PHOSPHATE 8 MG: 4 INJECTION, SOLUTION INTRA-ARTICULAR; INTRALESIONAL; INTRAMUSCULAR; INTRAVENOUS; SOFT TISSUE at 09:03

## 2023-03-14 RX ADMIN — FENTANYL CITRATE 100 MCG: 50 INJECTION, SOLUTION INTRAMUSCULAR; INTRAVENOUS at 09:03

## 2023-03-14 RX ADMIN — HYDROMORPHONE HYDROCHLORIDE 0.4 MG: 1 INJECTION, SOLUTION INTRAMUSCULAR; INTRAVENOUS; SUBCUTANEOUS at 11:03

## 2023-03-14 RX ADMIN — ACETAMINOPHEN 650 MG: 10 INJECTION, SOLUTION INTRAVENOUS at 11:03

## 2023-03-14 RX ADMIN — ONDANSETRON 4 MG: 2 INJECTION INTRAMUSCULAR; INTRAVENOUS at 09:03

## 2023-03-14 RX ADMIN — SODIUM CITRATE AND CITRIC ACID MONOHYDRATE 30 ML: 500; 334 SOLUTION ORAL at 09:03

## 2023-03-14 RX ADMIN — PROPOFOL 200 MG: 10 INJECTION, EMULSION INTRAVENOUS at 09:03

## 2023-03-14 RX ADMIN — ROPIVACAINE HYDROCHLORIDE 15 ML: 5 INJECTION, SOLUTION EPIDURAL; INFILTRATION; PERINEURAL at 08:03

## 2023-03-14 RX ADMIN — CEFAZOLIN 2 G: 330 INJECTION, POWDER, FOR SOLUTION INTRAMUSCULAR; INTRAVENOUS at 09:03

## 2023-03-14 RX ADMIN — LIDOCAINE HYDROCHLORIDE 50 MG: 20 INJECTION INTRAVENOUS at 09:03

## 2023-03-14 NOTE — OP NOTE
[facility] Operative Report      Date of Surgery:  3/14/23     Preoperative Diagnosis:   Right ankle fracture     Postoperative Diagnosis:  Same     Procedure(s):  Right ankle open reduction internal fixation  Right ankle syndesmosis fixation     Attending Surgeon:   Dr. Alonso Dobson MD     Resident Surgeon(s):  Dr. Holly Puga DO     Anesthesia:  General     Estimated Blood loss:  Minimal      IVF:  Per anesthesia     Findings:  Consistent with preop diagnosis     Complications:  None     Implants:  T[place]anda 2.7 mini frag locking plate x2  Arthrex TightRope x2     Indications for Procedure:  This is a 27-year-old female with a closed right ankle Maisonneuve fracture with involvement of the posterior and medial malleoli.  We discussed the indication, alternative treatments, risks, benefits for open reduction with internal fixation of the right ankle.  We discussed the risks of infection, malunion, nonunion, need for further surgery, posttraumatic arthritis, ankle stiffness, instability, persistent and chronic pain, neurovascular injury, need for hardware removal, loss of limb and life and consented her for the procedure.  On the day of surgery, the patient was found to be pregnant on her standard preoperative screening pregnancy test.  We discussed this finding with the patient along with the anesthesia team.  The anesthesiologist discussed the risks of undergoing surgery with the patient.  We discussed the urgent nature of the surgery for this patient is lower extremity fracture and she consented understanding the possible increased risk of surgical intervention including intraoperative radiation exposure and consented to proceeding with the procedure.  Made several adjustments during surgery to decrease her risk of this, including draping the patient with lead intraoperatively, decreasing the dosage of the fluoroscopic imaging used, and decreasing the overall usage of fluoroscopy intraoperatively.      Procedure In Detail:  The patient was met on the day of surgery in the preoperative holding area, we confirmed the correct operative site, procedure, answered all the patient's questions.  She she was also met by the anesthesia team and they consented her for their portion of the procedure.  The patient was transferred to the operative suite and positioned in the supine position on a regular table.  A nonsterile tourniquet was applied.  She was placed under general anesthesia.  The right lower extremity was prepped and draped in sterile manner.  A formal time-out was taken confirming the correct operative site, procedure, preoperative antibiotics, staff attending present for the critical portions of the case.      Attention was turned to the right lower extremity.  The tourniquet was raised to 250 mmHg.  A curvilinear incision was made over the medial malleolus.  Blunt dissection was taken down to preserve the crossing neurovascular structures.  The medial malleolus was visualized, there was a large vertical medial malleolus fracture that was clearly visualized.  This fracture was debrided, and then the soft tissue adjacent to it was elevated to reveal a large secondary medial malleolus fracture that involved a large portion of the posterior medial cortex of the distal tibia.  These 2 large separate fracture fragments were then cleared and reduced.  Given the vertical nature of these fractures, decision was made to fix them and stabilize him using antiglide plating.  Two separate 2.4 mm locking plates were positioned in antiglide fashion, 1 over each fracture fragment.  They were secured to the tibia using a combination of locking and bicortical nonlocking screws.  Fluoroscopic guidance was used to confirm appropriate reduction.  After the ankle mortise was reduced, these fractures were stabilized, attention was turned to the lateral ankle.  Given that this was a Maisonneuve ankle fracture, it was clearly evident  that there was a syndesmotic injury that warranted surgical stabilization.  A small 3 cm incision was made over the distal fibula at approximately 2 cm above the tibiotalar joint.  Blunt dissection was taken down to the lateral fibula.  The lateral fibula was exposed, an Arthrex 2 hole plate was applied to facilitate the placement of 2 separate Arthrex tight rope syndesmotic devices placed from the lateral cortex of the fibula through 4 cortices, with the suture button resting on the medial cortex of the tibia.  Care was taken to avoid the already brace placed hardware.  The foot was held in a dorsiflexed position to reduce the syndesmosis before tightening these buttons.  Final fluoroscopy confirmed appropriate reduction of this injury and fracture.  It revealed a stable ankle mortise.  The wounds were copiously irrigated, the tourniquet was let down hemostasis was achieved, all counts were correct at the end of the case.  The wounds were closed with a combination of nylon and Vicryl sutures.  Sterile dressings and a splint were applied.  The patient was awakened and transferred to the PACU without having suffered any untoward event.  All counts were correct at the end of the case. Patient was extubated without complications and transferred to the recovery room in stable condition.      Postoperative Management:  -follow up in clinic in 2 weeks for wound check suture removal  -transitioned to cam boot at 2 weeks   -maintain nonweightbearing for 6 weeks postoperatively  -patient will follow up with her OBGYN regarding her pregnancy and appropriate postoperative pain medications, we discussed this in extent with anesthesia    Dr. Holly Puga  [facility] Orthopaedic Surgery

## 2023-03-14 NOTE — TRANSFER OF CARE
Anesthesia Transfer of Care Note    Patient: Elicia Babcock    Procedure(s) Performed: Procedure(s) (LRB):  ORIF, ANKLE (Right)    Patient location: PACU    Transport from OR: Transported from OR on room air with adequate spontaneous ventilation    Post pain: adequate analgesia    Post assessment: no apparent anesthetic complications    Post vital signs: stable    Level of consciousness: awake    Nausea/Vomiting: no nausea/vomiting    Complications: none    Transfer of care protocol was followed      Last vitals:   Visit Vitals  BP (!) 174/102 (BP Location: Left arm, Patient Position: Lying)   Pulse 90   Temp 36.3 °C (97.4 °F) (Temporal)   Resp 18   LMP 02/07/2023 (Approximate)   SpO2 100%   Breastfeeding No

## 2023-03-14 NOTE — BRIEF OP NOTE
Orthopedic Surgery Brief Op Discharge    Procedure:   Right ankle ORIF    Pre-op diagnosis:   Right ankle fracture    Postop diagnosis: same    Surgeon:   Dr. Alonso Puga, DO    Fluids: see anesthesia documentation    Anesthesia: mac+regional    Complications: none      Patient presented to [facility] on day of scheduled surgery and Right ankle ORIF, please see operative report for further detail. Patient did well postoperatively and was found to be fit for discharge on POD# 0.  Their pain was controlled.  The patient met all discharge criteria.  The patient was discharged home in stable condition. Patient was given paper prescriptions.  They were given a follow-up appointment in 2 weeks in clinic for a wound check and range of motion check.  All questions and concerns of the patient were answered to their satisfaction.    Condition: Patient tolerated procedure well, was extubated, and returned to the recovery unit in stable condition.     Post Op Instructions    -maintain dressing clean and dry until followup  -nonweightbearing surgical extremity  -multimodal pain control  -return to clinic in 2 weeks for wound recheck and suture removal    Please call our team with questions or concerns    Dr. Holly Puga  [facility] Orthopaedic Surgery  Pager: [phone]

## 2023-03-14 NOTE — ANESTHESIA POSTPROCEDURE EVALUATION
Anesthesia Post Evaluation    Patient: Elicia Babcock    Procedure(s) Performed: Procedure(s) (LRB):  ORIF, ANKLE (Right)    Final Anesthesia Type: general      Patient location during evaluation: PACU  Patient participation: Yes- Able to Participate  Level of consciousness: awake and alert, awake and oriented  Post-procedure vital signs: reviewed and stable  Pain management: adequate  Airway patency: patent    PONV status at discharge: No PONV  Anesthetic complications: no      Cardiovascular status: blood pressure returned to baseline, hemodynamically stable and stable  Respiratory status: unassisted, spontaneous ventilation and room air  Hydration status: euvolemic  Follow-up not needed.          Vitals Value Taken Time   /81 03/14/23 1355   Temp 36.5 °C (97.7 °F) 03/14/23 1355   Pulse 78 03/14/23 1355   Resp 19 03/14/23 1355   SpO2 99 % 03/14/23 1355         Event Time   Out of Recovery 11:23:00         Pain/Ashley Score: Pain Rating Prior to Med Admin: 7 (3/14/2023 11:35 AM)  Ashley Score: 9 (3/14/2023 11:25 AM)

## 2023-03-14 NOTE — DISCHARGE INSTRUCTIONS
Patient is to keep leg ELEVATED as much as possible.   Patient may take Tylenol but NOT ibuprofen or any anti-inflammatory.  The ankle was repaired using screws, plates and ropes.  Patient is to be NON-WEIGHT BEARING for 6 weeks.  At follow up appointment in two weeks, the stitches will be removed and the splint will be changed to a boot.

## 2023-03-15 RX ORDER — OXYCODONE AND ACETAMINOPHEN 10; 325 MG/1; MG/1
1 TABLET ORAL EVERY 6 HOURS PRN
Qty: 28 TABLET | Refills: 0 | Status: SHIPPED | OUTPATIENT
Start: 2023-03-15 | End: 2023-10-17

## 2023-03-16 NOTE — PROGRESS NOTES
Faculty Attestation: I was present  throughout the key elements of the procedure.  I agree with the resident's findings and description.    Nik Dobson  Orthopaedic Surgery

## 2023-03-27 ENCOUNTER — OFFICE VISIT (OUTPATIENT)
Dept: ORTHOPEDICS | Facility: CLINIC | Age: 28
End: 2023-03-27
Payer: COMMERCIAL

## 2023-03-27 ENCOUNTER — HOSPITAL ENCOUNTER (OUTPATIENT)
Dept: RADIOLOGY | Facility: HOSPITAL | Age: 28
Discharge: HOME | End: 2023-03-27
Attending: ORTHOPAEDIC SURGERY
Payer: COMMERCIAL

## 2023-03-27 VITALS
OXYGEN SATURATION: 99 % | BODY MASS INDEX: 39.25 KG/M2 | HEART RATE: 78 BPM | HEIGHT: 69 IN | SYSTOLIC BLOOD PRESSURE: 98 MMHG | DIASTOLIC BLOOD PRESSURE: 68 MMHG | RESPIRATION RATE: 19 BRPM | WEIGHT: 265 LBS

## 2023-03-27 DIAGNOSIS — M25.571 ACUTE RIGHT ANKLE PAIN: ICD-10-CM

## 2023-03-27 DIAGNOSIS — M25.571 ACUTE RIGHT ANKLE PAIN: Primary | ICD-10-CM

## 2023-03-27 PROCEDURE — 99024 PR POST-OP FOLLOW-UP VISIT: ICD-10-PCS | Mod: ,,, | Performed by: STUDENT IN AN ORGANIZED HEALTH CARE EDUCATION/TRAINING PROGRAM

## 2023-03-27 PROCEDURE — 3074F PR MOST RECENT SYSTOLIC BLOOD PRESSURE < 130 MM HG: ICD-10-PCS | Mod: CPTII,,, | Performed by: STUDENT IN AN ORGANIZED HEALTH CARE EDUCATION/TRAINING PROGRAM

## 2023-03-27 PROCEDURE — 3078F PR MOST RECENT DIASTOLIC BLOOD PRESSURE < 80 MM HG: ICD-10-PCS | Mod: CPTII,,, | Performed by: STUDENT IN AN ORGANIZED HEALTH CARE EDUCATION/TRAINING PROGRAM

## 2023-03-27 PROCEDURE — 3008F PR BODY MASS INDEX (BMI) DOCUMENTED: ICD-10-PCS | Mod: CPTII,,, | Performed by: STUDENT IN AN ORGANIZED HEALTH CARE EDUCATION/TRAINING PROGRAM

## 2023-03-27 PROCEDURE — 99213 OFFICE O/P EST LOW 20 MIN: CPT | Mod: PBBFAC

## 2023-03-27 PROCEDURE — 73610 X-RAY EXAM OF ANKLE: CPT | Mod: TC,RT

## 2023-03-27 PROCEDURE — 1159F PR MEDICATION LIST DOCUMENTED IN MEDICAL RECORD: ICD-10-PCS | Mod: CPTII,,, | Performed by: STUDENT IN AN ORGANIZED HEALTH CARE EDUCATION/TRAINING PROGRAM

## 2023-03-27 PROCEDURE — 1159F MED LIST DOCD IN RCRD: CPT | Mod: CPTII,,, | Performed by: STUDENT IN AN ORGANIZED HEALTH CARE EDUCATION/TRAINING PROGRAM

## 2023-03-27 PROCEDURE — 3074F SYST BP LT 130 MM HG: CPT | Mod: CPTII,,, | Performed by: STUDENT IN AN ORGANIZED HEALTH CARE EDUCATION/TRAINING PROGRAM

## 2023-03-27 PROCEDURE — 3008F BODY MASS INDEX DOCD: CPT | Mod: CPTII,,, | Performed by: STUDENT IN AN ORGANIZED HEALTH CARE EDUCATION/TRAINING PROGRAM

## 2023-03-27 PROCEDURE — 99024 POSTOP FOLLOW-UP VISIT: CPT | Mod: ,,, | Performed by: STUDENT IN AN ORGANIZED HEALTH CARE EDUCATION/TRAINING PROGRAM

## 2023-03-27 PROCEDURE — 3078F DIAST BP <80 MM HG: CPT | Mod: CPTII,,, | Performed by: STUDENT IN AN ORGANIZED HEALTH CARE EDUCATION/TRAINING PROGRAM

## 2023-03-27 RX ORDER — HYDROCODONE BITARTRATE AND ACETAMINOPHEN 5; 325 MG/1; MG/1
1 TABLET ORAL EVERY 6 HOURS PRN
Qty: 28 TABLET | Refills: 0 | Status: SHIPPED | OUTPATIENT
Start: 2023-03-27 | End: 2023-10-17

## 2023-03-27 NOTE — PROGRESS NOTES
[facility] Orthopaedic Surgery H&P Note    In brief, Elicia Babcock is a 27 y.o. female status post right ankle ORIF 03/14/2023    HPI:   patient here for 1st follow up visit now 2 weeks out.  She is doing well.  Her pain is notably improved.  She is been strictly compliant with nonweightbearing and immobilization in a splint.  No wound issues.  She is been out of work because of this injury.    PMH: History reviewed. No pertinent past medical history.    PSH:   Past Surgical History:   Procedure Laterality Date    OPEN REDUCTION AND INTERNAL FIXATION (ORIF) OF INJURY OF ANKLE Right 3/14/2023    Procedure: ORIF, ANKLE;  Surgeon: Alonso Dobson MD;  Location: [facility];  Service: Orthopedics;  Laterality: Right;  arthrex       SH:   Social History     Socioeconomic History    Marital status: Single   Tobacco Use    Smoking status: Never    Smokeless tobacco: Never   Substance and Sexual Activity    Drug use: Never       FH:   Family History   Family history unknown: Yes       Allergies: Review of patient's allergies indicates:  No Known Allergies    ROS:  Constitutional- no fever, fatigue, weakness  Eye- no vision loss, eye redness, drainage, or pain  ENMT- no sore throat, ear pain, sinus pain/congestion, nasal congestion/drainage  Respiratory- no cough, wheezing, or shortness of breath  CV- no chest pain, no palpitations, no edema  GI- no N/V/D; no abdominal pain    Physical Exam:  Vitals:    03/27/23 0908   BP: 98/68   Pulse: 78   Resp: 19       General: NAD  Cardio: RRR by peripheral pulse  Pulm: Normal WOB on room air, symmetric chest rise  Abd: Soft, NT/ND    MSK:  Right lower extremity   Medial and lateral ankle incisions healing well no erythema drainage or fluctuance  Sutures removed today   Ankle dorsiflexion to 30° short of neutral  Foot is warm and well-perfused    Imaging:   Independent review of radiographs shows appropriate placement of ankle hardware an interval reduction.    Assessment:  27-year-old female  status post right ankle ORIF 03/14/2023     -patient is doing well at her 1st postoperative visit   -okay to transition to a cam boot   -sutures removed today   -we gave her a referral for physical therapy and went over ankle range of motion exercises to prevent a postoperative equinus contracture   -nonweightbearing for 6 weeks postop   -follow up in 1 month repeat x-rays, progress to weight-bearing as tolerated in a Cam boot at that point if she is doing well        Dr. Holly Puga  [facility] Orthopaedic Surgery

## 2023-03-27 NOTE — LETTER
March 27, 2023      [facility]  [address] [place] [address]  Phone: [phone]       Patient: Elicia Babcock   YOB: 1995  Date of Visit: 03/27/2023    To Whom It May Concern:    Hasmukh Babcokc  was at [facility] on 03/27/2023. The patient may return to work/school on 04/02/2023 with no restrictions. If you have any questions or concerns, or if I can be of further assistance, please do not hesitate to contact me.    Sincerely,    Viktoria Cortes MA

## 2023-03-27 NOTE — LETTER
March 27, 2023      [facility]  [address] [place] [id number]  Phone: [phone]       Patient: Elicia Babcock   YOB: 1995  Date of Visit: 03/27/2023    To Whom It May Concern:    Hasmukh Babcock  was at [facility] on 03/27/2023. The patient may return to work/school on 05/02/2023 with no restrictions. If you have any questions or concerns, or if I can be of further assistance, please do not hesitate to contact me.    Sincerely,    Viktoria Cortes MA

## 2023-03-27 NOTE — LETTER
March 27, 2023      [facility]  [address] [place] [id number]  Phone: [phone]       Patient: Elicia Babcock   YOB: 1995  Date of Visit: 03/27/2023    To Whom It May Concern:    Hasmukh Babcock  was at [facility] on 03/27/2023. The patient may return to work/school on 04/27/2023 with no restrictions. If you have any questions or concerns, or if I can be of further assistance, please do not hesitate to contact me.    Sincerely,    Viktoria Cortes MA

## 2023-03-28 NOTE — PROGRESS NOTES
Faculty Attestation: Elicia Babcock  was seen at [facility] in the Orthopaedic Clinic. Discussed with the resident at the time of the visit. History of Present Illness, Physical Exam, and Assessment and Plan reviewed. Treatment plan is reasonable and appropriate. Compliance with treatment recommendations is important. No procedure was performed.     Zach Barker MD  Orthopaedic Surgery

## 2023-05-01 ENCOUNTER — OFFICE VISIT (OUTPATIENT)
Dept: ORTHOPEDICS | Facility: CLINIC | Age: 28
End: 2023-05-01
Payer: COMMERCIAL

## 2023-05-01 ENCOUNTER — HOSPITAL ENCOUNTER (OUTPATIENT)
Dept: RADIOLOGY | Facility: HOSPITAL | Age: 28
Discharge: HOME | End: 2023-05-01
Attending: SPECIALIST
Payer: COMMERCIAL

## 2023-05-01 VITALS — WEIGHT: 265 LBS | HEIGHT: 69 IN | BODY MASS INDEX: 39.25 KG/M2

## 2023-05-01 DIAGNOSIS — G89.29 CHRONIC PAIN OF RIGHT ANKLE: Primary | ICD-10-CM

## 2023-05-01 DIAGNOSIS — M25.571 CHRONIC PAIN OF RIGHT ANKLE: ICD-10-CM

## 2023-05-01 DIAGNOSIS — G89.29 CHRONIC PAIN OF RIGHT ANKLE: ICD-10-CM

## 2023-05-01 DIAGNOSIS — M25.571 CHRONIC PAIN OF RIGHT ANKLE: Primary | ICD-10-CM

## 2023-05-01 PROCEDURE — 3008F BODY MASS INDEX DOCD: CPT | Mod: CPTII,,, | Performed by: SPECIALIST

## 2023-05-01 PROCEDURE — 99024 POSTOP FOLLOW-UP VISIT: CPT | Mod: ,,, | Performed by: SPECIALIST

## 2023-05-01 PROCEDURE — 1160F RVW MEDS BY RX/DR IN RCRD: CPT | Mod: CPTII,,, | Performed by: SPECIALIST

## 2023-05-01 PROCEDURE — 3008F PR BODY MASS INDEX (BMI) DOCUMENTED: ICD-10-PCS | Mod: CPTII,,, | Performed by: SPECIALIST

## 2023-05-01 PROCEDURE — 1160F PR REVIEW ALL MEDS BY PRESCRIBER/CLIN PHARMACIST DOCUMENTED: ICD-10-PCS | Mod: CPTII,,, | Performed by: SPECIALIST

## 2023-05-01 PROCEDURE — 99024 PR POST-OP FOLLOW-UP VISIT: ICD-10-PCS | Mod: ,,, | Performed by: SPECIALIST

## 2023-05-01 PROCEDURE — 1159F PR MEDICATION LIST DOCUMENTED IN MEDICAL RECORD: ICD-10-PCS | Mod: CPTII,,, | Performed by: SPECIALIST

## 2023-05-01 PROCEDURE — 1159F MED LIST DOCD IN RCRD: CPT | Mod: CPTII,,, | Performed by: SPECIALIST

## 2023-05-01 PROCEDURE — 99213 OFFICE O/P EST LOW 20 MIN: CPT | Mod: PBBFAC

## 2023-05-01 PROCEDURE — 73610 X-RAY EXAM OF ANKLE: CPT | Mod: TC,RT

## 2023-05-01 NOTE — PROGRESS NOTES
[facility] Orthopaedic Surgery H&P Note    In brief, Elicia Babcock is a 27 y.o. female status post right ankle ORIF 03/14/2023    HPI:  Patient is doing well.  Compliant with nonweightbearing.  In a boot.  No new issues.  No problems with incisions.    PMH: No past medical history on file.    PSH:   Past Surgical History:   Procedure Laterality Date    OPEN REDUCTION AND INTERNAL FIXATION (ORIF) OF INJURY OF ANKLE Right 3/14/2023    Procedure: ORIF, ANKLE;  Surgeon: Alonso Dobson MD;  Location: [facility];  Service: Orthopedics;  Laterality: Right;  arthrex       SH:   Social History     Socioeconomic History    Marital status: Single   Tobacco Use    Smoking status: Never    Smokeless tobacco: Never   Substance and Sexual Activity    Drug use: Never       FH:   Family History   Family history unknown: Yes       Allergies: Review of patient's allergies indicates:  No Known Allergies    ROS:  Constitutional- no fever, fatigue, weakness  Eye- no vision loss, eye redness, drainage, or pain  ENMT- no sore throat, ear pain, sinus pain/congestion, nasal congestion/drainage  Respiratory- no cough, wheezing, or shortness of breath  CV- no chest pain, no palpitations, no edema  GI- no N/V/D; no abdominal pain    Physical Exam:  There were no vitals filed for this visit.      General: NAD  Cardio: RRR by peripheral pulse  Pulm: Normal WOB on room air, symmetric chest rise  Abd: Soft, NT/ND    MSK:  Right lower extremity   Medial and lateral ankle incisions healing well no erythema drainage or fluctuance  Ankle dorsiflexion to to neutral with knee extended and flexed  EHL FHL gastrocsoleus TA peroneals 5/5 motor  Foot is warm and well-perfused    Imaging:   Independent review of radiographs shows appropriate placement of ankle hardware an interval reduction, appears to be healing appropriately at this point.    Assessment:  27-year-old female status post right ankle ORIF 03/14/2023     -at this point we will give patient a therapy to  work on range of motion and strengthening   -she will begin trying to weightbear in the cam boot for 2 weeks and then transition to regular shoes   -will see her back in 6 weeks with new x-rays    For note:  Patient seen in my clinic today on 05/01/2023.  Due to patient not being able to ambulate appropriately at this point we will keep her out of work at this time.  She can return to work on 06/01/2023 without any restrictions.    Neeraj Ramirez

## 2023-05-25 ENCOUNTER — LAB VISIT (OUTPATIENT)
Dept: LAB | Facility: HOSPITAL | Age: 28
End: 2023-05-25
Attending: OBSTETRICS & GYNECOLOGY
Payer: COMMERCIAL

## 2023-05-25 DIAGNOSIS — Z34.81 PRENATAL CARE, SUBSEQUENT PREGNANCY IN FIRST TRIMESTER: Primary | ICD-10-CM

## 2023-05-25 LAB — AFP-TM SERPL-MCNC: 30.1 NG/ML

## 2023-05-25 PROCEDURE — 82105 ALPHA-FETOPROTEIN SERUM: CPT

## 2023-05-25 PROCEDURE — 81511 FTL CGEN ABNOR FOUR ANAL: CPT | Mod: 90

## 2023-05-25 PROCEDURE — 36415 COLL VENOUS BLD VENIPUNCTURE: CPT

## 2023-05-26 LAB
# FETUSES: 1
2ND TRIMESTER 4 SCREEN SERPL-IMP: NORMAL
AFP ADJ MOM SERPL: 1.24 MOM
AFP SERPL IA-MCNC: 31.2 NG/ML
AGE AT DELIVERY: NORMAL
B-HCG ADJ MOM SERPL: 1.54 MOM
CHORION TYPE: NORMAL
COLLECT DATE: NORMAL
CURRENT SMOKER: NORMAL
FET TS 21 RISK FROM MAT AGE: NORMAL
GA METHOD: NORMAL
GA US.COMPOSITE.EST: NORMAL WK,D
HCG SERPL IA-ACNC: 57.3 IU/ML
HX OF NTD QL: NO
HX OF NTD QL: NO
HX OF TRISOMY 21 QL: NO
IDDM PATIENT QL: NO
INHIBIN A ADJ MOM SERPL: 1.36 MOM
INHIBIN SERPL-MCNC: 143 PG/ML
IVF PREGNANCY: NO
LABORATORY COMMENT REPORT: NORMAL
M PHYSICIAN PHONE NUMBER: NORMAL
MATERNAL RISK FACTORS: NORMAL
NEURAL TUBE DEFECT RISK FETUS: NORMAL %
RECOM F/U: NORMAL
TEST PERFORMANCE INFO SPEC: NORMAL
TS 18 RISK FETUS: NORMAL
TS 21 RISK FETUS: NORMAL
U ESTRIOL ADJ MOM SERPL: 0.85 MOM
U ESTRIOL SERPL-MCNC: 0.46 NG/ML

## 2023-06-05 ENCOUNTER — OFFICE VISIT (OUTPATIENT)
Dept: ORTHOPEDICS | Facility: CLINIC | Age: 28
End: 2023-06-05
Payer: COMMERCIAL

## 2023-06-05 ENCOUNTER — HOSPITAL ENCOUNTER (OUTPATIENT)
Dept: RADIOLOGY | Facility: HOSPITAL | Age: 28
Discharge: HOME | End: 2023-06-05
Attending: STUDENT IN AN ORGANIZED HEALTH CARE EDUCATION/TRAINING PROGRAM
Payer: COMMERCIAL

## 2023-06-05 DIAGNOSIS — G89.29 CHRONIC PAIN OF RIGHT ANKLE: ICD-10-CM

## 2023-06-05 DIAGNOSIS — G89.29 CHRONIC PAIN OF RIGHT ANKLE: Primary | ICD-10-CM

## 2023-06-05 DIAGNOSIS — M25.571 CHRONIC PAIN OF RIGHT ANKLE: Primary | ICD-10-CM

## 2023-06-05 DIAGNOSIS — M25.571 CHRONIC PAIN OF RIGHT ANKLE: ICD-10-CM

## 2023-06-05 PROCEDURE — 99024 PR POST-OP FOLLOW-UP VISIT: ICD-10-PCS | Mod: ,,, | Performed by: ORTHOPAEDIC SURGERY

## 2023-06-05 PROCEDURE — 99024 POSTOP FOLLOW-UP VISIT: CPT | Mod: ,,, | Performed by: ORTHOPAEDIC SURGERY

## 2023-06-05 PROCEDURE — 99213 OFFICE O/P EST LOW 20 MIN: CPT | Mod: PBBFAC

## 2023-06-05 RX ORDER — FOLIC ACID/MULTIVIT,IRON,MINER 0.4MG-18MG
TABLET ORAL
COMMUNITY
Start: 2021-12-07

## 2023-06-05 NOTE — PROGRESS NOTES
[facility] Orthopaedic Surgery H&P Note    In brief, Elicia Babcock is a 27 y.o. female status post right ankle ORIF 03/14/2023    HPI:  Able to ambulate.  Expected swelling.  Pain controlled.    PMH: History reviewed. No pertinent past medical history.    PSH:   Past Surgical History:   Procedure Laterality Date    OPEN REDUCTION AND INTERNAL FIXATION (ORIF) OF INJURY OF ANKLE Right 3/14/2023    Procedure: ORIF, ANKLE;  Surgeon: Alonso Dobson MD;  Location: [facility];  Service: Orthopedics;  Laterality: Right;  arthrex       SH:   Social History     Socioeconomic History    Marital status: Single   Tobacco Use    Smoking status: Never    Smokeless tobacco: Never   Substance and Sexual Activity    Alcohol use: Not Currently     Comment: occ    Drug use: Never       FH:   Family History   Family history unknown: Yes       Allergies: Review of patient's allergies indicates:  No Known Allergies    ROS:  Constitutional- no fever, fatigue, weakness  Eye- no vision loss, eye redness, drainage, or pain  ENMT- no sore throat, ear pain, sinus pain/congestion, nasal congestion/drainage  Respiratory- no cough, wheezing, or shortness of breath  CV- no chest pain, no palpitations, no edema  GI- no N/V/D; no abdominal pain    Physical Exam:  There were no vitals filed for this visit.      General: NAD  Cardio: RRR by peripheral pulse  Pulm: Normal WOB on room air, symmetric chest rise  Abd: Soft, NT/ND    MSK:  Right lower extremity   Medial and lateral ankle incisions healed  Ankle dorsiflexion to to 10° past neutral with knee extended   EHL FHL gastrocsoleus TA peroneals 5/5 motor  Foot is warm and well-perfused    Imaging:   Independent review of radiographs shows appropriate placement of ankle hardware an interval reduction, appears to be healing appropriately at this point.    Assessment:  27-year-old female status post right ankle ORIF 03/14/2023       Patient doing well.  She can continue activity as tolerated no restrictions we are  okay with her returning to work   -she will follow up as needed    Neeraj Ramirez

## 2023-06-05 NOTE — LETTER
June 5, 2023      [facility]  [address] [place] [id number]  Phone: [phone]       Patient: Elicia Babcock   YOB: 1995  Date of Visit: 06/05/2023    To Whom It May Concern:    Hasmukh Babcock  was at [facility] on 06/05/2023. The patient may have  returned to work on  6/01/2023 with no restrictions. If you have any questions or concerns, or if I can be of further assistance, please do not hesitate to contact me.    Sincerely,    Neeraj Ramirez MD

## 2023-06-05 NOTE — PROGRESS NOTES
Faculty Attestation: Elicia Babcock  was seen at [facility] in the Orthopaedic Clinic. Discussed with the resident at the time of the visit. History of Present Illness, Physical Exam, and Assessment and Plan reviewed. Treatment plan is reasonable and appropriate. Compliance with treatment recommendations is important. No procedure was performed.     Jonel Briggs MD  Orthopaedic Surgery

## 2023-07-10 ENCOUNTER — HOSPITAL ENCOUNTER (EMERGENCY)
Facility: HOSPITAL | Age: 28
Discharge: HOME | End: 2023-07-10
Attending: FAMILY MEDICINE
Payer: COMMERCIAL

## 2023-07-10 VITALS
RESPIRATION RATE: 16 BRPM | BODY MASS INDEX: 39.38 KG/M2 | SYSTOLIC BLOOD PRESSURE: 116 MMHG | HEART RATE: 109 BPM | OXYGEN SATURATION: 99 % | HEIGHT: 69 IN | TEMPERATURE: 99 F | WEIGHT: 265.88 LBS | DIASTOLIC BLOOD PRESSURE: 69 MMHG

## 2023-07-10 DIAGNOSIS — Z13.9 ENCOUNTER FOR MEDICAL SCREENING EXAMINATION: Primary | ICD-10-CM

## 2023-07-10 PROCEDURE — 99281 EMR DPT VST MAYX REQ PHY/QHP: CPT

## 2023-07-10 NOTE — Clinical Note
"Elicia Leal"Sadiq was seen and treated in our emergency department on 7/10/2023.  She may return to work on 07/12/2023.       If you have any questions or concerns, please don't hesitate to call.      WAGNER Bullock RN    "

## 2023-07-11 NOTE — ED PROVIDER NOTES
Encounter Date: 7/10/2023       History     Chief Complaint   Patient presents with    Sore Throat    Chills    COVID-19 Concerns     Sore throat, chills, body aches since today; exposed to someone with Covid. 22wk pregant; .     Patient who is currently 22 weeks pregnant presents today requesting covid test. She reports recent exposure. She started feeling bad today after her OB appt. She reports sore throat, body aches, and chills. Younger children with same symptoms.     The history is provided by the patient. No  was used.   Review of patient's allergies indicates:  No Known Allergies  History reviewed. No pertinent past medical history.  Past Surgical History:   Procedure Laterality Date    OPEN REDUCTION AND INTERNAL FIXATION (ORIF) OF INJURY OF ANKLE Right 3/14/2023    Procedure: ORIF, ANKLE;  Surgeon: Alonso Dobson MD;  Location: [facility];  Service: Orthopedics;  Laterality: Right;  arthrex     Family History   Family history unknown: Yes     Social History     Tobacco Use    Smoking status: Never    Smokeless tobacco: Never   Substance Use Topics    Alcohol use: Not Currently     Comment: occ    Drug use: Never     Review of Systems   Constitutional:  Positive for chills. Negative for fever.   HENT:  Positive for sore throat. Negative for ear discharge, ear pain, sinus pressure and sinus pain.    Respiratory:  Negative for cough and shortness of breath.    Cardiovascular:  Negative for chest pain, palpitations and leg swelling.   Gastrointestinal:  Negative for abdominal pain, constipation, diarrhea, nausea and vomiting.   Genitourinary:  Negative for dysuria, flank pain, hematuria and vaginal bleeding.   Musculoskeletal:  Negative for arthralgias and myalgias.   Skin:  Negative for rash.   Allergic/Immunologic: Negative for immunocompromised state.   Neurological:  Negative for syncope, light-headedness and headaches.   All other systems reviewed and are negative.    Physical Exam      Initial Vitals [07/10/23 2231]   BP Pulse Resp Temp SpO2   116/69 109 16 99.1 °F (37.3 °C) 99 %      MAP       --         Physical Exam    Nursing note and vitals reviewed.  Constitutional: She appears well-developed and well-nourished. She is not diaphoretic. No distress.   HENT:   Head: Normocephalic and atraumatic.   Mouth/Throat: Oropharynx is clear and moist. No oropharyngeal exudate.   Eyes: Conjunctivae and EOM are normal.   Neck: Neck supple.   Normal range of motion.  Cardiovascular:  Normal rate, regular rhythm, normal heart sounds and intact distal pulses.           Pulmonary/Chest: Breath sounds normal. No respiratory distress.   Abdominal: Abdomen is soft. She exhibits no distension. There is no abdominal tenderness. There is no rebound and no guarding.   Musculoskeletal:         General: No edema.      Cervical back: Normal range of motion and neck supple.     Neurological: She is alert and oriented to person, place, and time. GCS score is 15. GCS eye subscore is 4. GCS verbal subscore is 5. GCS motor subscore is 6.   Skin: Skin is warm and dry. Capillary refill takes less than 2 seconds. No rash noted.   Psychiatric: She has a normal mood and affect.       ED Course   Procedures  Labs Reviewed - No data to display       Imaging Results    None          Medications - No data to display              ED Course as of 07/10/23 2250   Mon Jul 10, 2023   2249 Medical screening exam complete. There is no medical emergency present today at this time. Patient will follow up at urgent care clinic or with pcp. Stable for discharge.  [SA]      ED Course User Index  [SA] EVELIN Ruiz             Clinical Impression:   Final diagnoses:  [Z13.9] Encounter for medical screening examination (Primary)        ED Disposition Condition    Discharge Stable          ED Prescriptions    None       Follow-up Information       Follow up With Specialties Details Why Contact Info    [facility]  Dept Emergency Medicine  If symptoms worsen return to ED immediately [address] [facility] [id number] [phone]    Cecilia Mcleod NP Pediatrics In 2 days  [address]  [address]  [place] [address]  [phone]               EVELIN Ruiz  07/10/23 5842

## 2023-07-31 ENCOUNTER — APPOINTMENT (OUTPATIENT)
Dept: LAB | Facility: HOSPITAL | Age: 28
End: 2023-07-31
Attending: OBSTETRICS & GYNECOLOGY
Payer: COMMERCIAL

## 2023-07-31 DIAGNOSIS — Z34.82 PRENATAL CARE, SUBSEQUENT PREGNANCY IN SECOND TRIMESTER: Primary | ICD-10-CM

## 2023-07-31 LAB
BASOPHILS # BLD AUTO: 0.02 X10(3)/MCL
BASOPHILS NFR BLD AUTO: 0.2 %
EOSINOPHIL # BLD AUTO: 0.03 X10(3)/MCL (ref 0–0.9)
EOSINOPHIL NFR BLD AUTO: 0.4 %
ERYTHROCYTE [DISTWIDTH] IN BLOOD BY AUTOMATED COUNT: 12.8 % (ref 11.5–17)
GLUCOSE 1H P 100 G GLC PO SERPL-MCNC: 131 MG/DL (ref 100–180)
GROUP & RH: NORMAL
HBV SURFACE AG SERPL QL IA: NONREACTIVE
HCT VFR BLD AUTO: 33.2 % (ref 37–47)
HCV AB SERPL QL IA: NONREACTIVE
HGB BLD-MCNC: 10.3 G/DL (ref 12–16)
HIV 1+2 AB+HIV1 P24 AG SERPL QL IA: NONREACTIVE
IMM GRANULOCYTES # BLD AUTO: 0.04 X10(3)/MCL (ref 0–0.04)
IMM GRANULOCYTES NFR BLD AUTO: 0.5 %
INDIRECT COOMBS GEL: NORMAL
LYMPHOCYTES # BLD AUTO: 1.36 X10(3)/MCL (ref 0.6–4.6)
LYMPHOCYTES NFR BLD AUTO: 16.3 %
MCH RBC QN AUTO: 28.9 PG (ref 27–31)
MCHC RBC AUTO-ENTMCNC: 31 G/DL (ref 33–36)
MCV RBC AUTO: 93 FL (ref 80–94)
MONOCYTES # BLD AUTO: 0.44 X10(3)/MCL (ref 0.1–1.3)
MONOCYTES NFR BLD AUTO: 5.3 %
NEUTROPHILS # BLD AUTO: 6.43 X10(3)/MCL (ref 2.1–9.2)
NEUTROPHILS NFR BLD AUTO: 77.3 %
NRBC BLD AUTO-RTO: 0 %
PLATELET # BLD AUTO: 254 X10(3)/MCL (ref 130–400)
PMV BLD AUTO: 11 FL (ref 7.4–10.4)
RBC # BLD AUTO: 3.57 X10(6)/MCL (ref 4.2–5.4)
SPECIMEN OUTDATE: NORMAL
T PALLIDUM AB SER QL: NONREACTIVE
TSH SERPL-ACNC: 0.73 UIU/ML (ref 0.35–4.94)
WBC # SPEC AUTO: 8.32 X10(3)/MCL (ref 4.5–11.5)

## 2023-07-31 PROCEDURE — 84443 ASSAY THYROID STIM HORMONE: CPT

## 2023-07-31 PROCEDURE — 82950 GLUCOSE TEST: CPT

## 2023-07-31 PROCEDURE — 87088 URINE BACTERIA CULTURE: CPT

## 2023-07-31 PROCEDURE — 86803 HEPATITIS C AB TEST: CPT

## 2023-07-31 PROCEDURE — 85025 COMPLETE CBC W/AUTO DIFF WBC: CPT

## 2023-07-31 PROCEDURE — 86900 BLOOD TYPING SEROLOGIC ABO: CPT | Performed by: OBSTETRICS & GYNECOLOGY

## 2023-07-31 PROCEDURE — 87389 HIV-1 AG W/HIV-1&-2 AB AG IA: CPT

## 2023-07-31 PROCEDURE — 87340 HEPATITIS B SURFACE AG IA: CPT

## 2023-07-31 PROCEDURE — 36415 COLL VENOUS BLD VENIPUNCTURE: CPT

## 2023-07-31 PROCEDURE — 85660 RBC SICKLE CELL TEST: CPT

## 2023-07-31 PROCEDURE — 86780 TREPONEMA PALLIDUM: CPT

## 2023-07-31 PROCEDURE — 86762 RUBELLA ANTIBODY: CPT

## 2023-08-01 ENCOUNTER — HOSPITAL ENCOUNTER (EMERGENCY)
Facility: HOSPITAL | Age: 28
Discharge: HOME | End: 2023-08-01
Attending: INTERNAL MEDICINE
Payer: COMMERCIAL

## 2023-08-01 VITALS
WEIGHT: 263.88 LBS | RESPIRATION RATE: 18 BRPM | HEIGHT: 69 IN | HEART RATE: 70 BPM | OXYGEN SATURATION: 100 % | DIASTOLIC BLOOD PRESSURE: 78 MMHG | TEMPERATURE: 98 F | BODY MASS INDEX: 39.08 KG/M2 | SYSTOLIC BLOOD PRESSURE: 119 MMHG

## 2023-08-01 DIAGNOSIS — Z34.92 SECOND TRIMESTER PREGNANCY: ICD-10-CM

## 2023-08-01 DIAGNOSIS — N30.90 CYSTITIS WITHOUT HEMATURIA: Primary | ICD-10-CM

## 2023-08-01 LAB
APPEARANCE UR: ABNORMAL
BACTERIA #/AREA URNS AUTO: ABNORMAL /HPF
BILIRUB UR QL STRIP.AUTO: NEGATIVE
C TRACH DNA SPEC QL NAA+PROBE: NOT DETECTED
COLOR UR: YELLOW
GLUCOSE UR QL STRIP.AUTO: NORMAL
HGB S BLD QL SOLY: NEGATIVE
HYALINE CASTS #/AREA URNS LPF: ABNORMAL /LPF
KETONES UR QL STRIP.AUTO: NEGATIVE
LEUKOCYTE ESTERASE UR QL STRIP.AUTO: 500
MUCOUS THREADS URNS QL MICRO: ABNORMAL /LPF
N GONORRHOEA DNA SPEC QL NAA+PROBE: NOT DETECTED
NITRITE UR QL STRIP.AUTO: NEGATIVE
PH UR STRIP.AUTO: 7 [PH]
PROT UR QL STRIP.AUTO: ABNORMAL
RBC #/AREA URNS AUTO: ABNORMAL /HPF
RBC UR QL AUTO: NEGATIVE
RUBV IGG SERPL IA-ACNC: 0.4
RUBV IGG SERPL QL IA: NEGATIVE
SOURCE (OHS): NORMAL
SP GR UR STRIP.AUTO: 1.03
SQUAMOUS #/AREA URNS LPF: ABNORMAL /HPF
UROBILINOGEN UR STRIP-ACNC: ABNORMAL
WBC #/AREA URNS AUTO: ABNORMAL /HPF

## 2023-08-01 PROCEDURE — 87591 N.GONORRHOEAE DNA AMP PROB: CPT

## 2023-08-01 PROCEDURE — 99283 EMERGENCY DEPT VISIT LOW MDM: CPT

## 2023-08-01 PROCEDURE — 81001 URINALYSIS AUTO W/SCOPE: CPT | Performed by: INTERNAL MEDICINE

## 2023-08-01 RX ORDER — CEPHALEXIN 500 MG/1
500 CAPSULE ORAL EVERY 8 HOURS
Qty: 15 CAPSULE | Refills: 0 | Status: SHIPPED | OUTPATIENT
Start: 2023-08-01 | End: 2023-08-06

## 2023-08-01 NOTE — ED PROVIDER NOTES
Encounter Date: 2023       History     Chief Complaint   Patient presents with    Abdominal Pain     Lower abdominal pain since last night. Currently 25 weeks gestation; . No vaginal bleeding or discharge. Reports normal fetal movement.     Patient who is currently 25 wks pregnant presenting to the ED with generalized lower abdominal pain that started yesterday. She reports that the pain is intermittent and felt most while she is walking. This is the first time she is feeling this pain. Baby is kicking fine. Denies dysuria, vaginal bleeding, fever, chills. She states that she felt nauseated yesterday, but denies vomiting.    The history is provided by the patient.     Review of patient's allergies indicates:  No Known Allergies  History reviewed. No pertinent past medical history.  Past Surgical History:   Procedure Laterality Date    OPEN REDUCTION AND INTERNAL FIXATION (ORIF) OF INJURY OF ANKLE Right 3/14/2023    Procedure: ORIF, ANKLE;  Surgeon: Alonso Dobson MD;  Location: [facility];  Service: Orthopedics;  Laterality: Right;  arthrex     Family History   Family history unknown: Yes     Social History     Tobacco Use    Smoking status: Never    Smokeless tobacco: Never   Substance Use Topics    Alcohol use: Not Currently     Comment: occ    Drug use: Never     Review of Systems   Constitutional:  Negative for fever.   HENT:  Negative for sore throat.    Respiratory:  Negative for shortness of breath.    Cardiovascular:  Negative for chest pain.   Gastrointestinal:  Positive for abdominal pain. Negative for nausea.   Genitourinary:  Negative for dysuria.   Musculoskeletal:  Negative for back pain.   Skin:  Negative for rash.   Neurological:  Negative for weakness.   Hematological:  Does not bruise/bleed easily.   All other systems reviewed and are negative.      Physical Exam     Initial Vitals [23 1608]   BP Pulse Resp Temp SpO2   120/79 74 16 98.2 °F (36.8 °C) 100 %      MAP       --          Physical Exam    Nursing note and vitals reviewed.  Constitutional: She appears well-developed and well-nourished.   HENT:   Head: Normocephalic and atraumatic.   Mouth/Throat: Oropharynx is clear and moist.   Eyes: Conjunctivae are normal. Pupils are equal, round, and reactive to light.   Neck: No JVD present.   Normal range of motion.  Cardiovascular:  Normal rate and normal heart sounds.           Pulmonary/Chest: Breath sounds normal.   Abdominal: Abdomen is soft. Bowel sounds are normal. There is no abdominal tenderness.   Musculoskeletal:         General: No tenderness or edema. Normal range of motion.      Cervical back: Normal range of motion.     Neurological: She is alert and oriented to person, place, and time. She has normal strength. GCS score is 15. GCS eye subscore is 4. GCS verbal subscore is 5. GCS motor subscore is 6.   Psychiatric: She has a normal mood and affect. Her behavior is normal. Judgment and thought content normal.         ED Course   ED US Pelvis OB    Date/Time: 8/1/2023 6:16 PM    Performed by: Iam Garay MD  Authorized by: Iam Garay MD    Indication:  Pregnancy  Identified Structures:  Uterus transverse and Uterus sagittal  Definitive IUP:  Present  Uterine Contents:  Fetus  Free fluid in cul-de-sac:  Absent  Free fluid in hepatorenal space:  Absent  (bpm):  142   25   3  Location of IUP:  Fundus  Right adnexa:  Normal  Left adnexa:  Normal  Impression:  IUP  Charge?:  No (educational)    Labs Reviewed   URINALYSIS - Abnormal; Notable for the following components:       Result Value    Appearance, UA Turbid (*)     Protein, UA 1+ (*)     Urobilinogen, UA 1+ (*)     Leukocyte Esterase,  (*)     WBC, UA 21-50 (*)     Bacteria, UA Few (*)     Squamous Epithelial Cells, UA Many (*)     Mucous, UA Few (*)     Hyaline Casts, UA 3-5 (*)     All other components within normal limits   CULTURE, URINE   CHLAMYDIA/GONORRHOEAE(GC), PCR           Imaging Results    None          Medications - No data to display  Medical Decision Making:   Differential Diagnosis:   Acute cystitis, Urethritis, Round ligament pain  Clinical Tests:   Lab Tests: Ordered and Reviewed  The following lab test(s) were unremarkable: Urinalysis       <> Summary of Lab: I have reviewed urinalysis results - Positive for leukocytes, WBC, hyaline cast  ED Management:  - Urinalysis ordered. Positive for leukocytes, WBC, bacteria  - Urine culture pending  - GC/ Chlamydia pending  - Keflex sent to patients' pharmacy              Attending Attestation:   Physician Attestation Statement for Resident:  As the supervising MD   Physician Attestation Statement: I have personally seen and examined this patient.   I agree with the above history.  -:   As the supervising MD I agree with the above PE.     As the supervising MD I agree with the above treatment, course, plan, and disposition.   I was personally present during the entire procedure.  I have reviewed and agree with the residents interpretation of the following: lab data.                              Clinical Impression:   Final diagnoses:  [N30.90] Cystitis without hematuria (Primary)  [Z34.92] Second trimester pregnancy        ED Disposition Condition    Discharge Stable          ED Prescriptions       Medication Sig Dispense Start Date End Date Auth. Provider    cephALEXin (KEFLEX) 500 MG capsule Take 1 capsule (500 mg total) by mouth every 8 (eight) hours. for 5 days 15 capsule 8/1/2023 8/6/2023 Farrah Locke MD          Follow-up Information       Follow up With Specialties Details Why Contact Info    Cecilia Mcleod NP Pediatrics Schedule an appointment as soon as possible for a visit in 1 month  [address]  [address]  [facility] [address]  [phone]      [facility] Emergency Medicine Schedule an appointment as soon as possible for a visit  If symptoms worsen [address] [facility]  [place] [address] [phone]    Akira Pedro Jr., MD Obstetrics and Gynecology Schedule an appointment as soon as possible for a visit in 2 weeks  [facility] [address]  [phone]               Farrah Locke MD  Resident  08/01/23 1744       Iam Garay MD  08/01/23 5929

## 2023-08-02 LAB — BACTERIA UR CULT: NORMAL

## 2023-10-16 LAB — STREP B PCR (OHS): DETECTED

## 2023-10-17 ENCOUNTER — HOSPITAL ENCOUNTER (EMERGENCY)
Facility: HOSPITAL | Age: 28
Discharge: HOME | End: 2023-10-17
Attending: OBSTETRICS & GYNECOLOGY
Payer: COMMERCIAL

## 2023-10-17 PROCEDURE — 99284 EMERGENCY DEPT VISIT MOD MDM: CPT

## 2023-10-17 NOTE — ED PROVIDER NOTES
ADAM NOTE  [facility]     Admit Date: 10/17/2023  ADAM Physician: Aiyana Carreon  Primary OBGYN: Dr. Akira Pedro    Admit Diagnosis/Chief Complaint:  back pain in middle back  Discharge Diagnosis:  tailbone discomfort    Chief Complaint   Patient presents with    Back Pain      36.1 week iup with c/o back pain       Hospital Course:  Elicia Babcock is a 28 y.o.  at 36w1d presents complaining of mid back pain in lower area (lumbago).  This IUP is complicated by obesity.    Patient denies vaginal bleeding, leakage of fluid, contractions, headache, vision changes, RUQ pain, dysuria, fever, and nausea/vomiting.  Fetal Movement: normal.    LMP 2023 (Approximate)   Breastfeeding No        General: in no apparent distress alert oriented times 3  Cardiovascular: regular rate and rhythm no murmurs  Respiratory: unlabored  Abdominal: soft, nontender, nondistended, no abnormal masses, no epigastric pain obese FHT present  Back: lumbar tenderness absent CVA tenderness none suprapubic tenderness absent  Extremeties no redness or tenderness in the calves or thighs edema 1+    SVE (PeriWATCH)  Dilation (cm): 1  Effacement (%): 40  Station: -3  Cervical Position: Mid Position  Cervical Consistency: Medium  Examined by:: Aiyana Carreon MD  Sommer Score: 4  Simplified Sommer Score: 2         EFM: Cat 1, 145 modBTV, +accel, no decel (reassuring, reactive)  TOCO: none      Medical Decision Making:      LABS:   No results found for this or any previous visit (from the past 24 hour(s)).    Imaging Results    None          ASSESMENT and clinical impression: Elicia Babcock is a 28 y.o.   at 36w1d with lumbago    Discharge Diagnosis/clinical impression:   There is no problem list on file for this patient.      Status:Stable    Disposition:  discharged to home    Medications:   none    Patient Instructions:   - Pt was given routine pregnancy instructions including to return to  triage if she had any vaginal bleeding (other than spotting for the next 48hrs), any loss of fluid like her water broke, decreased fetal movement, or contractions Q 5min lasting for 2 or more hours. Pt was also instructed to drink copious water. Patient voiced understanding of all these instructions and was subsequently discharged home. Tylenol use and maternity belt use discussed. All questions answered. Pt left ADAM with good understanding of plan.   Preeclampsia/ROM/labor/fever/decreased FM with [facility] precautions discussed, voiced understanding     She will follow up with her primary OB as scheduled    Aiyana Carreon MD  OB/GYN Hospitalist  3:38 PM 10/17/2023

## 2023-11-05 ENCOUNTER — HOSPITAL ENCOUNTER (EMERGENCY)
Facility: HOSPITAL | Age: 28
Discharge: HOME | End: 2023-11-05
Payer: COMMERCIAL

## 2023-11-05 VITALS
SYSTOLIC BLOOD PRESSURE: 125 MMHG | DIASTOLIC BLOOD PRESSURE: 87 MMHG | TEMPERATURE: 98 F | HEART RATE: 81 BPM | RESPIRATION RATE: 16 BRPM

## 2023-11-05 PROBLEM — R52 PAIN DURING LABOR: Status: RESOLVED | Noted: 2023-11-05 | Resolved: 2023-11-05

## 2023-11-05 PROBLEM — Z3A.38 38 WEEKS GESTATION OF PREGNANCY: Status: ACTIVE | Noted: 2023-11-05

## 2023-11-05 PROBLEM — N85.8 ALTERATION IN COMFORT ASSOCIATED WITH UTERINE CONTRACTIONS: Status: ACTIVE | Noted: 2023-11-05

## 2023-11-05 PROBLEM — R52 PAIN DURING LABOR: Status: ACTIVE | Noted: 2023-11-05

## 2023-11-05 PROCEDURE — 99284 EMERGENCY DEPT VISIT MOD MDM: CPT

## 2023-11-05 NOTE — HOSPITAL COURSE
Fetal status noted to be reassuring with ctx's q 3-6 minutes, irregular.  Cervix initially noted for be 3-4/70/-2, vtx.  Pt ambulated during course of observation.

## 2023-11-05 NOTE — HPI
28 yr  @ 38 wk 6 d presenting ctx's and pelvic/back pain.  Pt states no prenatal complications with this pregnancy.  Pt states neg PMH.  PSH positive for only ankle surgery.

## 2023-11-05 NOTE — SUBJECTIVE & OBJECTIVE
Obstetric HPI:  Patient reports Intensity: mild contractions, active fetal movement, No vaginal bleeding , No loss of fluid     This pregnancy has been complicated by no significant complications.    OB History    Para Term  AB Living   4 3 2 1 0 3   SAB IAB Ectopic Multiple Live Births   0 0 0 0 3      # Outcome Date GA Lbr Russ/2nd Weight Sex Delivery Anes PTL Lv   4 Current            3 Term  38w0d   M Vag-Spont   AMAURY   2 Term  37w0d   M Vag-Spont   AMAURY   1  2018 36w0d   M Vag-Spont   AMAURY     No past medical history on file.  Past Surgical History:   Procedure Laterality Date    OPEN REDUCTION AND INTERNAL FIXATION (ORIF) OF INJURY OF ANKLE Right 3/14/2023    Procedure: ORIF, ANKLE;  Surgeon: Alonso Dobson MD;  Location: [facility];  Service: Orthopedics;  Laterality: Right;  arthrex       (Not in a hospital admission)      Review of patient's allergies indicates:  No Known Allergies     Family History    Family history is unknown by patient.       Tobacco Use    Smoking status: Never    Smokeless tobacco: Never   Substance and Sexual Activity    Alcohol use: Not Currently     Comment: occ    Drug use: Never    Sexual activity: Not on file     Review of Systems   Constitutional: Negative.    Respiratory: Negative.     Cardiovascular: Negative.    Gastrointestinal: Negative.    Endocrine: Negative.    Genitourinary: Negative.    Musculoskeletal: Negative.    Neurological: Negative.    Hematological: Negative.    Psychiatric/Behavioral: Negative.     Breast: negative.       Objective:     Vital Signs (Most Recent):  Temp: 97.7 °F (36.5 °C) (23)  Pulse: 81 (23 021)  Resp: 16 (23)  BP: 125/87 (23) Vital Signs (24h Range):  Temp:  [97.7 °F (36.5 °C)] 97.7 °F (36.5 °C)  Pulse:  [81] 81  Resp:  [16] 16  BP: (125)/(87) 125/87        There is no height or weight on file to calculate BMI.    FHT: 135, reactive Cat 1 (reassuring)  TOCO:  Q 3-6 minutes      Physical Exam     Cervix:  Dilation:  3  Effacement:  70%  Station: -2  Presentation: Vertex     Significant Labs:  Lab Results   Component Value Date    GROUPTRH O POS 07/31/2023    AFP 30.10 (H) 05/25/2023    AFP 31.2 05/25/2023       I have personallly reviewed all pertinent lab results from the last 24 hours.

## 2023-11-05 NOTE — H&P
[facility] Emergency Dept (OB)  Obstetrics  History & Physical    Patient Name: Elicia Babcock  MRN: [id number]  Admission Date: 2023  Primary Care Provider: Ellen, Primary Doctor    Subjective:     Principal Problem:Alteration in comfort associated with uterine contractions    History of Present Illness:  28 yr  @ 38 wk 6 d presenting ctx's and pelvic/back pain.  Pt states no prenatal complications with this pregnancy.  Pt states neg PMH.  PSH positive for only ankle surgery.      Obstetric HPI:  Patient reports Intensity: mild contractions, active fetal movement, No vaginal bleeding , No loss of fluid     This pregnancy has been complicated by no significant complications.    OB History    Para Term  AB Living   4 3 2 1 0 3   SAB IAB Ectopic Multiple Live Births   0 0 0 0 3      # Outcome Date GA Lbr Russ/2nd Weight Sex Delivery Anes PTL Lv   4 Current            3 Term  38w0d   M Vag-Spont   AMAURY   2 Term  37w0d   M Vag-Spont   AMAURY   1   36w0d   M Vag-Spont   AMAURY     No past medical history on file.  Past Surgical History:   Procedure Laterality Date    OPEN REDUCTION AND INTERNAL FIXATION (ORIF) OF INJURY OF ANKLE Right 3/14/2023    Procedure: ORIF, ANKLE;  Surgeon: Alonso Dobson MD;  Location: [facility];  Service: Orthopedics;  Laterality: Right;  arthrex       (Not in a hospital admission)      Review of patient's allergies indicates:  No Known Allergies     Family History    Family history is unknown by patient.       Tobacco Use    Smoking status: Never    Smokeless tobacco: Never   Substance and Sexual Activity    Alcohol use: Not Currently     Comment: occ    Drug use: Never    Sexual activity: Not on file     Review of Systems   Constitutional: Negative.    Respiratory: Negative.     Cardiovascular: Negative.    Gastrointestinal: Negative.    Endocrine: Negative.    Genitourinary: Negative.    Musculoskeletal: Negative.    Neurological:  Negative.    Hematological: Negative.    Psychiatric/Behavioral: Negative.     Breast: negative.       Objective:     Vital Signs (Most Recent):  Temp: 97.7 °F (36.5 °C) (23)  Pulse: 81 (23)  Resp: 16 (23)  BP: 125/87 (23) Vital Signs (24h Range):  Temp:  [97.7 °F (36.5 °C)] 97.7 °F (36.5 °C)  Pulse:  [81] 81  Resp:  [16] 16  BP: (125)/(87) 125/87        There is no height or weight on file to calculate BMI.    FHT: 135, reactive Cat 1 (reassuring)  TOCO:  Q 3-6 minutes     Physical Exam     Cervix:  Dilation:  3  Effacement:  70%  Station: -2  Presentation: Vertex     Significant Labs:  Lab Results   Component Value Date    GROUPTRH O POS 2023    AFP 30.10 (H) 2023    AFP 31.2 2023       I have personallly reviewed all pertinent lab results from the last 24 hours.    Assessment/Plan:     28 y.o. female  at 38w6d for:    * Alteration in comfort associated with uterine contractions  Pt observed for labor.  No changes noted.  Pt discharged and told for f/u prn    38 weeks gestation of pregnancy  Pt observed for labor.  No changes noted.  Pt discharged and told for f/u prn            Darryn Smith MD  Obstetrics  [facility] Emergency Dept (OB)

## 2023-11-08 ENCOUNTER — HOSPITAL ENCOUNTER (INPATIENT)
Facility: HOSPITAL | Age: 28
LOS: 2 days | Discharge: HOME | End: 2023-11-10
Attending: OBSTETRICS & GYNECOLOGY | Admitting: OBSTETRICS & GYNECOLOGY
Payer: COMMERCIAL

## 2023-11-08 DIAGNOSIS — Z34.90 ENCOUNTER FOR INDUCTION OF LABOR: ICD-10-CM

## 2023-11-08 DIAGNOSIS — N85.8 ALTERATION IN COMFORT ASSOCIATED WITH UTERINE CONTRACTIONS: ICD-10-CM

## 2023-11-08 DIAGNOSIS — Z3A.38 38 WEEKS GESTATION OF PREGNANCY: Primary | ICD-10-CM

## 2023-11-08 LAB
BASOPHILS # BLD AUTO: 0.02 X10(3)/MCL
BASOPHILS NFR BLD AUTO: 0.3 %
EOSINOPHIL # BLD AUTO: 0.03 X10(3)/MCL (ref 0–0.9)
EOSINOPHIL NFR BLD AUTO: 0.4 %
ERYTHROCYTE [DISTWIDTH] IN BLOOD BY AUTOMATED COUNT: 13.1 % (ref 11.5–17)
GROUP & RH: NORMAL
HCT VFR BLD AUTO: 33.1 % (ref 37–47)
HGB BLD-MCNC: 10.4 G/DL (ref 12–16)
IMM GRANULOCYTES # BLD AUTO: 0.01 X10(3)/MCL (ref 0–0.04)
IMM GRANULOCYTES NFR BLD AUTO: 0.1 %
INDIRECT COOMBS GEL: NORMAL
LYMPHOCYTES # BLD AUTO: 2.19 X10(3)/MCL (ref 0.6–4.6)
LYMPHOCYTES NFR BLD AUTO: 29.8 %
MCH RBC QN AUTO: 28.7 PG (ref 27–31)
MCHC RBC AUTO-ENTMCNC: 31.4 G/DL (ref 33–36)
MCV RBC AUTO: 91.4 FL (ref 80–94)
MONOCYTES # BLD AUTO: 0.56 X10(3)/MCL (ref 0.1–1.3)
MONOCYTES NFR BLD AUTO: 7.6 %
NEUTROPHILS # BLD AUTO: 4.55 X10(3)/MCL (ref 2.1–9.2)
NEUTROPHILS NFR BLD AUTO: 61.8 %
NRBC BLD AUTO-RTO: 0 %
PLATELET # BLD AUTO: 229 X10(3)/MCL (ref 130–400)
PMV BLD AUTO: 11.4 FL (ref 7.4–10.4)
RBC # BLD AUTO: 3.62 X10(6)/MCL (ref 4.2–5.4)
SPECIMEN OUTDATE: NORMAL
T PALLIDUM AB SER QL: NONREACTIVE
WBC # SPEC AUTO: 7.36 X10(3)/MCL (ref 4.5–11.5)

## 2023-11-08 PROCEDURE — 63600175 PHARM REV CODE 636 W HCPCS: Performed by: OBSTETRICS & GYNECOLOGY

## 2023-11-08 PROCEDURE — 36004724 HC OB OR TIME LEV III - 1ST 15 MIN: Mod: SZN | Performed by: OBSTETRICS & GYNECOLOGY

## 2023-11-08 PROCEDURE — 25000003 PHARM REV CODE 250: Performed by: OBSTETRICS & GYNECOLOGY

## 2023-11-08 PROCEDURE — 36004725 HC OB OR TIME LEV III - EA ADD 15 MIN: Mod: SZN | Performed by: OBSTETRICS & GYNECOLOGY

## 2023-11-08 PROCEDURE — 86780 TREPONEMA PALLIDUM: CPT | Performed by: OBSTETRICS & GYNECOLOGY

## 2023-11-08 PROCEDURE — 37000009 HC ANESTHESIA EA ADD 15 MINS: Mod: SZN | Performed by: OBSTETRICS & GYNECOLOGY

## 2023-11-08 PROCEDURE — 11000001 HC ACUTE MED/SURG PRIVATE ROOM

## 2023-11-08 PROCEDURE — 51702 INSERT TEMP BLADDER CATH: CPT

## 2023-11-08 PROCEDURE — 37000008 HC ANESTHESIA 1ST 15 MINUTES: Performed by: OBSTETRICS & GYNECOLOGY

## 2023-11-08 PROCEDURE — 90472 IMMUNIZATION ADMIN EACH ADD: CPT | Performed by: OBSTETRICS & GYNECOLOGY

## 2023-11-08 PROCEDURE — 85025 COMPLETE CBC W/AUTO DIFF WBC: CPT | Performed by: OBSTETRICS & GYNECOLOGY

## 2023-11-08 PROCEDURE — 86901 BLOOD TYPING SEROLOGIC RH(D): CPT | Performed by: OBSTETRICS & GYNECOLOGY

## 2023-11-08 PROCEDURE — 72100002 HC LABOR CARE, 1ST 8 HOURS

## 2023-11-08 PROCEDURE — 90471 IMMUNIZATION ADMIN: CPT | Performed by: OBSTETRICS & GYNECOLOGY

## 2023-11-08 PROCEDURE — 90715 TDAP VACCINE 7 YRS/> IM: CPT | Performed by: OBSTETRICS & GYNECOLOGY

## 2023-11-08 PROCEDURE — 90686 IIV4 VACC NO PRSV 0.5 ML IM: CPT | Performed by: OBSTETRICS & GYNECOLOGY

## 2023-11-08 PROCEDURE — 71000033 HC RECOVERY, INTIAL HOUR: Performed by: OBSTETRICS & GYNECOLOGY

## 2023-11-08 PROCEDURE — 72200005 HC VAGINAL DELIVERY LEVEL II

## 2023-11-08 RX ORDER — HYDROCORTISONE 25 MG/G
CREAM TOPICAL 3 TIMES DAILY PRN
Status: DISCONTINUED | OUTPATIENT
Start: 2023-11-08 | End: 2023-11-10 | Stop reason: HOSPADM

## 2023-11-08 RX ORDER — NALOXONE HCL 0.4 MG/ML
0.02 VIAL (ML) INJECTION
Status: DISCONTINUED | OUTPATIENT
Start: 2023-11-08 | End: 2023-11-08

## 2023-11-08 RX ORDER — EPHEDRINE SULFATE 50 MG/ML
10 INJECTION, SOLUTION INTRAVENOUS
Status: CANCELLED | OUTPATIENT
Start: 2023-11-08

## 2023-11-08 RX ORDER — OXYCODONE AND ACETAMINOPHEN 10; 325 MG/1; MG/1
1 TABLET ORAL EVERY 6 HOURS PRN
Status: DISCONTINUED | OUTPATIENT
Start: 2023-11-08 | End: 2023-11-10 | Stop reason: HOSPADM

## 2023-11-08 RX ORDER — OXYTOCIN/RINGER'S LACTATE 30/500 ML
95 PLASTIC BAG, INJECTION (ML) INTRAVENOUS ONCE
Status: DISCONTINUED | OUTPATIENT
Start: 2023-11-08 | End: 2023-11-10 | Stop reason: HOSPADM

## 2023-11-08 RX ORDER — MISOPROSTOL 100 UG/1
800 TABLET ORAL ONCE AS NEEDED
Status: DISCONTINUED | OUTPATIENT
Start: 2023-11-08 | End: 2023-11-10 | Stop reason: HOSPADM

## 2023-11-08 RX ORDER — OXYTOCIN 10 [USP'U]/ML
10 INJECTION, SOLUTION INTRAMUSCULAR; INTRAVENOUS ONCE AS NEEDED
Status: DISCONTINUED | OUTPATIENT
Start: 2023-11-08 | End: 2023-11-10 | Stop reason: HOSPADM

## 2023-11-08 RX ORDER — ONDANSETRON 4 MG/1
8 TABLET, ORALLY DISINTEGRATING ORAL EVERY 8 HOURS PRN
Status: DISCONTINUED | OUTPATIENT
Start: 2023-11-08 | End: 2023-11-08

## 2023-11-08 RX ORDER — DIPHENHYDRAMINE HYDROCHLORIDE 50 MG/ML
25 INJECTION INTRAMUSCULAR; INTRAVENOUS EVERY 4 HOURS PRN
Status: DISCONTINUED | OUTPATIENT
Start: 2023-11-08 | End: 2023-11-10 | Stop reason: HOSPADM

## 2023-11-08 RX ORDER — DIPHENOXYLATE HYDROCHLORIDE AND ATROPINE SULFATE 2.5; .025 MG/1; MG/1
2 TABLET ORAL EVERY 6 HOURS PRN
Status: DISCONTINUED | OUTPATIENT
Start: 2023-11-08 | End: 2023-11-08 | Stop reason: SDUPTHER

## 2023-11-08 RX ORDER — LIDOCAINE HYDROCHLORIDE 10 MG/ML
10 INJECTION INFILTRATION; PERINEURAL ONCE AS NEEDED
Status: DISCONTINUED | OUTPATIENT
Start: 2023-11-08 | End: 2023-11-08

## 2023-11-08 RX ORDER — SIMETHICONE 80 MG
1 TABLET,CHEWABLE ORAL 4 TIMES DAILY PRN
Status: DISCONTINUED | OUTPATIENT
Start: 2023-11-08 | End: 2023-11-08

## 2023-11-08 RX ORDER — OXYTOCIN/RINGER'S LACTATE 30/500 ML
0-30 PLASTIC BAG, INJECTION (ML) INTRAVENOUS CONTINUOUS
Status: DISCONTINUED | OUTPATIENT
Start: 2023-11-08 | End: 2023-11-10 | Stop reason: HOSPADM

## 2023-11-08 RX ORDER — OXYTOCIN/RINGER'S LACTATE 30/500 ML
95 PLASTIC BAG, INJECTION (ML) INTRAVENOUS ONCE AS NEEDED
Status: DISCONTINUED | OUTPATIENT
Start: 2023-11-08 | End: 2023-11-10 | Stop reason: HOSPADM

## 2023-11-08 RX ORDER — PENICILLIN G 3000000 [IU]/50ML
3 INJECTION, SOLUTION INTRAVENOUS
Status: DISCONTINUED | OUTPATIENT
Start: 2023-11-08 | End: 2023-11-08

## 2023-11-08 RX ORDER — METHYLERGONOVINE MALEATE 0.2 MG/ML
200 INJECTION INTRAVENOUS ONCE AS NEEDED
Status: DISCONTINUED | OUTPATIENT
Start: 2023-11-08 | End: 2023-11-08 | Stop reason: SDUPTHER

## 2023-11-08 RX ORDER — CARBOPROST TROMETHAMINE 250 UG/ML
250 INJECTION, SOLUTION INTRAMUSCULAR
Status: DISCONTINUED | OUTPATIENT
Start: 2023-11-08 | End: 2023-11-08 | Stop reason: SDUPTHER

## 2023-11-08 RX ORDER — MISOPROSTOL 100 UG/1
800 TABLET ORAL ONCE AS NEEDED
Status: DISCONTINUED | OUTPATIENT
Start: 2023-11-08 | End: 2023-11-08 | Stop reason: SDUPTHER

## 2023-11-08 RX ORDER — OXYTOCIN/RINGER'S LACTATE 30/500 ML
334 PLASTIC BAG, INJECTION (ML) INTRAVENOUS ONCE AS NEEDED
Status: DISCONTINUED | OUTPATIENT
Start: 2023-11-08 | End: 2023-11-10 | Stop reason: HOSPADM

## 2023-11-08 RX ORDER — DIPHENOXYLATE HYDROCHLORIDE AND ATROPINE SULFATE 2.5; .025 MG/1; MG/1
2 TABLET ORAL EVERY 6 HOURS PRN
Status: DISCONTINUED | OUTPATIENT
Start: 2023-11-08 | End: 2023-11-10 | Stop reason: HOSPADM

## 2023-11-08 RX ORDER — OXYTOCIN/RINGER'S LACTATE 30/500 ML
95 PLASTIC BAG, INJECTION (ML) INTRAVENOUS ONCE AS NEEDED
Status: DISCONTINUED | OUTPATIENT
Start: 2023-11-08 | End: 2023-11-08 | Stop reason: SDUPTHER

## 2023-11-08 RX ORDER — SODIUM CHLORIDE, SODIUM LACTATE, POTASSIUM CHLORIDE, CALCIUM CHLORIDE 600; 310; 30; 20 MG/100ML; MG/100ML; MG/100ML; MG/100ML
INJECTION, SOLUTION INTRAVENOUS CONTINUOUS
Status: DISCONTINUED | OUTPATIENT
Start: 2023-11-08 | End: 2023-11-08

## 2023-11-08 RX ORDER — PROCHLORPERAZINE EDISYLATE 5 MG/ML
5 INJECTION INTRAMUSCULAR; INTRAVENOUS EVERY 6 HOURS PRN
Status: DISCONTINUED | OUTPATIENT
Start: 2023-11-08 | End: 2023-11-08

## 2023-11-08 RX ORDER — OXYTOCIN/RINGER'S LACTATE 30/500 ML
334 PLASTIC BAG, INJECTION (ML) INTRAVENOUS ONCE
Status: DISCONTINUED | OUTPATIENT
Start: 2023-11-08 | End: 2023-11-08

## 2023-11-08 RX ORDER — FENTANYL/BUPIVACAINE/NS/PF 2-1250MCG
PLASTIC BAG, INJECTION (ML) INJECTION CONTINUOUS
Status: DISCONTINUED | OUTPATIENT
Start: 2023-11-08 | End: 2023-11-08

## 2023-11-08 RX ORDER — OXYTOCIN/RINGER'S LACTATE 30/500 ML
334 PLASTIC BAG, INJECTION (ML) INTRAVENOUS ONCE AS NEEDED
Status: DISCONTINUED | OUTPATIENT
Start: 2023-11-08 | End: 2023-11-08 | Stop reason: SDUPTHER

## 2023-11-08 RX ORDER — METHYLERGONOVINE MALEATE 0.2 MG/ML
200 INJECTION INTRAVENOUS ONCE AS NEEDED
Status: DISCONTINUED | OUTPATIENT
Start: 2023-11-08 | End: 2023-11-10 | Stop reason: HOSPADM

## 2023-11-08 RX ORDER — MUPIROCIN 20 MG/G
OINTMENT TOPICAL
Status: DISCONTINUED | OUTPATIENT
Start: 2023-11-08 | End: 2023-11-08

## 2023-11-08 RX ORDER — OXYTOCIN 10 [USP'U]/ML
10 INJECTION, SOLUTION INTRAMUSCULAR; INTRAVENOUS ONCE AS NEEDED
Status: DISCONTINUED | OUTPATIENT
Start: 2023-11-08 | End: 2023-11-08 | Stop reason: SDUPTHER

## 2023-11-08 RX ORDER — ACETAMINOPHEN 325 MG/1
325 TABLET ORAL EVERY 4 HOURS PRN
Status: DISCONTINUED | OUTPATIENT
Start: 2023-11-08 | End: 2023-11-10 | Stop reason: HOSPADM

## 2023-11-08 RX ORDER — CARBOPROST TROMETHAMINE 250 UG/ML
250 INJECTION, SOLUTION INTRAMUSCULAR
Status: DISCONTINUED | OUTPATIENT
Start: 2023-11-08 | End: 2023-11-10 | Stop reason: HOSPADM

## 2023-11-08 RX ORDER — OXYCODONE AND ACETAMINOPHEN 5; 325 MG/1; MG/1
1 TABLET ORAL EVERY 6 HOURS PRN
Status: DISCONTINUED | OUTPATIENT
Start: 2023-11-08 | End: 2023-11-10 | Stop reason: HOSPADM

## 2023-11-08 RX ORDER — SIMETHICONE 80 MG
1 TABLET,CHEWABLE ORAL EVERY 4 HOURS PRN
Status: DISCONTINUED | OUTPATIENT
Start: 2023-11-08 | End: 2023-11-10 | Stop reason: HOSPADM

## 2023-11-08 RX ORDER — ACETAMINOPHEN 325 MG/1
650 TABLET ORAL EVERY 4 HOURS PRN
Status: DISCONTINUED | OUTPATIENT
Start: 2023-11-08 | End: 2023-11-10 | Stop reason: HOSPADM

## 2023-11-08 RX ORDER — CALCIUM CARBONATE 200(500)MG
500 TABLET,CHEWABLE ORAL 3 TIMES DAILY PRN
Status: DISCONTINUED | OUTPATIENT
Start: 2023-11-08 | End: 2023-11-08

## 2023-11-08 RX ORDER — OXYTOCIN/RINGER'S LACTATE 30/500 ML
95 PLASTIC BAG, INJECTION (ML) INTRAVENOUS ONCE
Status: DISCONTINUED | OUTPATIENT
Start: 2023-11-08 | End: 2023-11-08

## 2023-11-08 RX ORDER — DIPHENHYDRAMINE HCL 25 MG
25 CAPSULE ORAL EVERY 4 HOURS PRN
Status: DISCONTINUED | OUTPATIENT
Start: 2023-11-08 | End: 2023-11-10 | Stop reason: HOSPADM

## 2023-11-08 RX ORDER — ONDANSETRON 4 MG/1
4 TABLET, ORALLY DISINTEGRATING ORAL EVERY 6 HOURS PRN
Status: DISCONTINUED | OUTPATIENT
Start: 2023-11-08 | End: 2023-11-10 | Stop reason: HOSPADM

## 2023-11-08 RX ORDER — IBUPROFEN 600 MG/1
600 TABLET ORAL EVERY 6 HOURS
Status: DISCONTINUED | OUTPATIENT
Start: 2023-11-08 | End: 2023-11-10 | Stop reason: HOSPADM

## 2023-11-08 RX ORDER — DOCUSATE SODIUM 100 MG/1
200 CAPSULE, LIQUID FILLED ORAL 2 TIMES DAILY PRN
Status: DISCONTINUED | OUTPATIENT
Start: 2023-11-08 | End: 2023-11-10 | Stop reason: HOSPADM

## 2023-11-08 RX ADMIN — SODIUM CHLORIDE, POTASSIUM CHLORIDE, SODIUM LACTATE AND CALCIUM CHLORIDE: 600; 310; 30; 20 INJECTION, SOLUTION INTRAVENOUS at 05:11

## 2023-11-08 RX ADMIN — IBUPROFEN 600 MG: 600 TABLET, FILM COATED ORAL at 02:11

## 2023-11-08 RX ADMIN — Medication 2 MILLI-UNITS/MIN: at 06:11

## 2023-11-08 RX ADMIN — DOCUSATE SODIUM 200 MG: 100 CAPSULE, LIQUID FILLED ORAL at 08:11

## 2023-11-08 RX ADMIN — IBUPROFEN 600 MG: 600 TABLET, FILM COATED ORAL at 08:11

## 2023-11-08 RX ADMIN — SODIUM CHLORIDE 5 MILLION UNITS: 900 INJECTION INTRAVENOUS at 05:11

## 2023-11-08 RX ADMIN — INFLUENZA VIRUS VACCINE 0.5 ML: 15; 15; 15; 15 SUSPENSION INTRAMUSCULAR at 05:11

## 2023-11-08 RX ADMIN — Medication: at 01:11

## 2023-11-08 RX ADMIN — PENICILLIN G 3 MILLION UNITS: 3000000 INJECTION, SOLUTION INTRAVENOUS at 09:11

## 2023-11-08 RX ADMIN — TETANUS TOXOID, REDUCED DIPHTHERIA TOXOID AND ACELLULAR PERTUSSIS VACCINE, ADSORBED 0.5 ML: 5; 2.5; 8; 8; 2.5 SUSPENSION INTRAMUSCULAR at 05:11

## 2023-11-08 RX ADMIN — SODIUM CHLORIDE, POTASSIUM CHLORIDE, SODIUM LACTATE AND CALCIUM CHLORIDE 1000 ML: 600; 310; 30; 20 INJECTION, SOLUTION INTRAVENOUS at 06:11

## 2023-11-08 NOTE — H&P
[facility]                       [address]                      [place] [address]    PATIENT NAME:       NALLELY KOTHARI  YOB: 1995  CSN:                [id number]   MRN:                [id number]  ADMIT DATE:         2023 04:59:00  PHYSICIAN:          Akira Pedro Jr, MD                        HISTORY AND PHYSICAL      HISTORY OF PRESENT ILLNESS:  A 28-year-old,  4, para 2-1-0-3 with   pregnancy at 39 weeks presents to obstetric unit today for induction.  The   patient had prenatal care since early in pregnancy.  Pregnancy has been   uneventful.  Refer to the OB flow sheet for history.    PHYSICAL EXAMINATION:  VITAL SIGNS:  On admission were stable.  She was afebrile.    Physical examination within normal limits.    ASSESSMENT:  Pregnancy at term, desires delivery.    PLAN:  Admit for induction.        ______________________________  Akira Pedro Jr, MD    DJE/AQS  DD:  2023  Time:  07:46AM  DT:  2023  Time:  08:24AM  Job #:  [id number]      HISTORY AND PHYSICAL

## 2023-11-09 PROBLEM — Z34.90 ENCOUNTER FOR INDUCTION OF LABOR: Status: ACTIVE | Noted: 2023-11-09

## 2023-11-09 LAB — PSYCHE PATHOLOGY RESULT: NORMAL

## 2023-11-09 PROCEDURE — 11000001 HC ACUTE MED/SURG PRIVATE ROOM

## 2023-11-09 PROCEDURE — 25000003 PHARM REV CODE 250: Performed by: OBSTETRICS & GYNECOLOGY

## 2023-11-09 RX ORDER — OXYCODONE AND ACETAMINOPHEN 5; 325 MG/1; MG/1
1 TABLET ORAL EVERY 6 HOURS PRN
Qty: 20 TABLET | Refills: 0 | Status: SHIPPED | OUTPATIENT
Start: 2023-11-09

## 2023-11-09 RX ADMIN — IBUPROFEN 600 MG: 600 TABLET, FILM COATED ORAL at 02:11

## 2023-11-09 RX ADMIN — IBUPROFEN 600 MG: 600 TABLET, FILM COATED ORAL at 08:11

## 2023-11-09 RX ADMIN — DOCUSATE SODIUM 200 MG: 100 CAPSULE, LIQUID FILLED ORAL at 08:11

## 2023-11-09 RX ADMIN — IBUPROFEN 600 MG: 600 TABLET, FILM COATED ORAL at 07:11

## 2023-11-09 NOTE — DISCHARGE SUMMARY
[facility]                       [address] [place] [address]    PATIENT NAME:       NALLELY KOTHARI  YOB: 1995  CSN:                [id number]   MRN:                [id number]  ADMIT DATE:         11/08/2023 04:59:00  PHYSICIAN:          Akira Pedro Jr, MD                          DISCHARGE SUMMARY    DATE OF DISCHARGE:  11/09/2023 00:00:00    DIAGNOSIS:  Status post vaginal delivery.    The patient underwent vaginal without incident.  She was admitted to the   postpartal GYN service where she had an uneventful and speedy recovery.  She is   ambulating and tolerating a regular diet.  Her vitals are stable.  She was   afebrile.  She had normal bowel and bladder function.  She will be discharged   home on postpartum day #1.    CONDITION ON DISCHARGE:  Stable.    DIET:  Regular.    ACTIVITY:  Pelvic rest.    MEDICATIONS:  Percocet p.r.n. and Motrin p.r.n.    FOLLOWUP:  Follow up with [facility] in 3 weeks.        ______________________________  Akira Pedro Jr, MD    DJE/AQS  DD:  11/09/2023  Time:  10:14AM  DT:  11/09/2023  Time:  10:59AM  Job #:  [id number]      DISCHARGE SUMMARY

## 2023-11-09 NOTE — OP NOTE
[facility]                       [address] [address]    PATIENT NAME:      NALLELY KOTHARI  YOB: 1995  CSN:               [id number]  MRN:               [id number]  ADMIT DATE:        11/08/2023 04:59:00  PHYSICIAN:         Akira Pedro Jr, MD                          OPERATIVE REPORT      DATE OF SURGERY:    11/08/2023 00:00:00    SURGEON:  Akira Pedro Jr, MD    DELIVERY NOTE    TYPE OF DELIVERY:  Spontaneous vaginal delivery.    DESCRIPTION OF PROCEDURE:  The patient was admitted to the obstetric unit for   induction.  She was started on IV Pitocin drip.  She had artificial rupture of   membranes, clear fluid.  Received epidural anesthesia and progressed to complete   cervical dilatation.  With maternal contraction and Valsalva, the infant's head   was delivered without incident.  Anterior shoulder delivered.  Rest of the   infant was delivered in full.  Cord was clamped and cut.  Appropriate cord gases   and cord blood were obtained.  The infant was handed off to awaiting [facility]   nurses.  The placenta was delivered spontaneously.  IV Pitocin drip was begun.    Uterus was massaged and noted to be firm at the umbilicus.  A vaginal cervical   inspection revealed no lacerations or tears.  Apgars and weight are pending.    BLOOD LOSS:  310 mL.        ______________________________  Akira Pedro Jr, MD    DJE/AQS  DD:  11/09/2023  Time:  10:13AM  DT:  11/09/2023  Time:  11:41AM  Job #:  [id number]      OPERATIVE REPORT

## 2023-11-10 VITALS
DIASTOLIC BLOOD PRESSURE: 79 MMHG | RESPIRATION RATE: 16 BRPM | OXYGEN SATURATION: 100 % | TEMPERATURE: 99 F | HEART RATE: 69 BPM | HEIGHT: 69 IN | BODY MASS INDEX: 40.14 KG/M2 | WEIGHT: 271 LBS | SYSTOLIC BLOOD PRESSURE: 117 MMHG

## 2023-11-10 PROCEDURE — 25000003 PHARM REV CODE 250: Performed by: OBSTETRICS & GYNECOLOGY

## 2023-11-10 RX ADMIN — Medication: at 10:11

## 2023-11-10 RX ADMIN — IBUPROFEN 600 MG: 600 TABLET, FILM COATED ORAL at 10:11

## 2023-11-10 RX ADMIN — IBUPROFEN 600 MG: 600 TABLET, FILM COATED ORAL at 04:11

## 2023-11-10 NOTE — DISCHARGE SUMMARY
"Delivery Discharge Summary  Obstetrics      Primary OB Clinician: Ezequiel Pedro MD    Discharge Provider: Guevara Rose MD    Admission date: 2023  Discharge date: 11/10/2023    Admit Dx:  Patient Active Problem List   Diagnosis    38 weeks gestation of pregnancy    Alteration in comfort associated with uterine contractions    Encounter for induction of labor        Discharge Dx:    Same    Procedure: vaginal delivery    Hospital Course:  Elicia Babcock is a 28 y.o. now  who was admitted on 2023 for delivery. Patient delivered a viable . Please see delivery note for further details. Pt was in stable condition post delivery and was transferred to the Mother-Baby Unit. Her postpartum course was uncomplicated. On the date of discharge, patient's pain is controlled with oral pain medications. She is tolerating ambulation without SOB or CP, and PO diet without N/V. Reported lochia is within the normal range. Pt in stable condition and ready for discharge.     Pertinent studies:  Postpartum CBC  Lab Results   Component Value Date    WBC 7.36 2023    HGB 10.4 (L) 2023    HCT 33.1 (L) 2023    MCV 91.4 2023     2023       Delivery:    Episiotomy: None   Lacerations: None   Repair suture:     Repair # of packets:     Blood loss (ml):       Birth information:  YOB: 2025   Time of birth: 10:32 AM   Sex: female   Delivery type: Vaginal, Spontaneous   Gestational Age: 39w2d     Measurements    Weight: 3515 g  Weight (lbs): 7 lb 12 oz  Length: 53.3 cm  Length (in): 21"  Head circumference: 34 cm         Delivery Clinician: Delivery Providers    Delivering clinician:    Provider Role    Sunny May, RN Registered Nurse    Radha Malone, [facility]    Tg Forbes RN Registered Nurse    Rashid Sauer RN Registered Nurse             Additional  information:  Forceps:    Vacuum:    Breech:    Observed anomalies      Living?:     Apgars  "   Living status: Living  Apgar Component Scores:  1 min.:  5 min.:  10 min.:  15 min.:  20 min.:    Skin color:  0  1       Heart rate:  2  2       Reflex irritability:  2  2       Muscle tone:  2  2       Respiratory effort:  2  2       Total:  8  9       Apgars assigned by: NICU TEAM         Placenta: Delivered:       appearance    Disposition: To home, self care    Follow Up: 6 weeks    Patient Instructions:   1. Call the office for any bleeding >2 pads/hour for >2 hours, temperature >100.4, pain that is uncontrolled with medications, or for any other concerns.  2. Pelvic rest and no tub baths x 6 weeks.

## 2023-11-10 NOTE — PLAN OF CARE
Problem: Adult Inpatient Plan of Care  Goal: Plan of Care Review  Outcome: Ongoing, Progressing  Goal: Patient-Specific Goal (Individualized)  Outcome: Ongoing, Progressing  Goal: Absence of Hospital-Acquired Illness or Injury  Outcome: Ongoing, Progressing  Goal: Optimal Comfort and Wellbeing  Outcome: Ongoing, Progressing  Goal: Readiness for Transition of Care  Outcome: Ongoing, Progressing     Problem: Infection  Goal: Absence of Infection Signs and Symptoms  Outcome: Ongoing, Progressing     Problem: Bariatric Environmental Safety  Goal: Safety Maintained with Care  Outcome: Ongoing, Progressing     Problem: Adjustment to Role Transition (Postpartum Vaginal Delivery)  Goal: Successful Maternal Role Transition  Outcome: Ongoing, Progressing     Problem: Bleeding (Postpartum Vaginal Delivery)  Goal: Hemostasis  Outcome: Ongoing, Progressing     Problem: Infection (Postpartum Vaginal Delivery)  Goal: Absence of Infection Signs/Symptoms  Outcome: Ongoing, Progressing     Problem: Pain (Postpartum Vaginal Delivery)  Goal: Acceptable Pain Control  Outcome: Ongoing, Progressing     Problem: Urinary Retention (Postpartum Vaginal Delivery)  Goal: Effective Urinary Elimination  Outcome: Ongoing, Progressing     Problem: Skin Injury Risk Increased  Goal: Skin Health and Integrity  Outcome: Ongoing, Progressing

## 2024-03-26 NOTE — HIM RECORD RETIREMENT NOTE
care home of Incomplete Medical Record    3/26/24    Patient Name: Elicia Babcock  Contact Serial # (SWW): [phone]  Patient Medical Record # (MRN): [id number]  Date of Service: Office Visit on 6/5/2023  Physician Name: Neeraj Ramirez MD [[id number]]; Neeraj Ramirez [[id number]     This record has been reviewed and is being retired as incomplete by the approval of the  Medical Staff Operating Committee (MSOC)     On 03/18/2024., due to:  Unavailability of Provider     Missing Information/Comments:  []    Discharge Summary   []    DC Note/Short Stay Summary   []    ED Provider Note   []    Delivery Note   []    History & Physical   []   Operative Note   []     Procedure Note   []     Physician Order   [x]     Verbal Order   []       Other, specify:            Name band;

## (undated) DEVICE — STOCKINETTE IMPERVIOUS 16X54IN

## (undated) DEVICE — COVER MAYO STAND REINFRCD 30

## (undated) DEVICE — GLOVE PROTEXIS BLUE LATEX 7.5

## (undated) DEVICE — CORD BIPOLAR 12 FOOT

## (undated) DEVICE — DRESSING N ADH OIL EMUL 3X8 3S

## (undated) DEVICE — SPONGE COTTON TRAY 4X4IN

## (undated) DEVICE — TIP SUCTION YANKAUER

## (undated) DEVICE — STAPLER SKIN PROXIMATE WIDE

## (undated) DEVICE — ELECTRODE PATIENT RETURN DISP

## (undated) DEVICE — TOURNIQUET SB QC DP 44X4IN

## (undated) DEVICE — GOWN POLY REINF X-LONG 2XL

## (undated) DEVICE — BIT DRILL BB TAK DISPOSABLE

## (undated) DEVICE — SOL NACL IRR 1000ML BTL

## (undated) DEVICE — COVER TABLE HVY DTY 60X90IN

## (undated) DEVICE — SUT VICRYL 2-0 36 CT-1

## (undated) DEVICE — DRAPE T EXTRM SURG 121X128X90

## (undated) DEVICE — GLOVE PROTEXIS BLUE LATEX 7

## (undated) DEVICE — SUT ETHILON 3-0 FS-1 30

## (undated) DEVICE — Device

## (undated) DEVICE — SUT ETHIBOND XTRA 1 CTX

## (undated) DEVICE — PAD ABDOMINAL STERILE 8X10IN

## (undated) DEVICE — GLOVE PROTEXIS HYDROGEL SZ7

## (undated) DEVICE — DRAPE INCISE IOBAN 2 23X23IN

## (undated) DEVICE — GOWN SMARTSLEEVE AAMI LVL4 LG

## (undated) DEVICE — KIT SURGICAL TURNOVER

## (undated) DEVICE — SUT VICRYL BR 1 GEN 27 CT-1

## (undated) DEVICE — GLOVE PROTEXIS HYDROGEL SZ8

## (undated) DEVICE — DRAPE C-ARMOR EQUIPMENT COVER

## (undated) DEVICE — APPLICATOR CHLORAPREP ORN 26ML

## (undated) DEVICE — SUT VICRYL PLUS 3-0 SH 18IN

## (undated) DEVICE — GLOVE PROTEXIS BLUE LATEX 8

## (undated) DEVICE — CUFF ATS 2 PORT SNGL BLDR 34IN

## (undated) DEVICE — GLOVE PROTEXIS BLUE LATEX 8.5

## (undated) DEVICE — GLOVE PROTEXIS LTX MICRO 8

## (undated) DEVICE — DRAPE STERI U-SHAPED 47X51IN

## (undated) DEVICE — SUT 3-0 VICRYL / SH (J416)

## (undated) DEVICE — PADDING WYTEX UNDRCST 6INX4YD

## (undated) DEVICE — COVER C-ARM STRAP BAND 44X80IN

## (undated) DEVICE — BIT DRILL SCALED 2X105MM

## (undated) DEVICE — BANDAGE VELCLOSE ELAS 6INX5YD

## (undated) DEVICE — GAUZE SPONGE 4X4 12PLY